# Patient Record
Sex: MALE | Race: WHITE | HISPANIC OR LATINO | Employment: FULL TIME | URBAN - METROPOLITAN AREA
[De-identification: names, ages, dates, MRNs, and addresses within clinical notes are randomized per-mention and may not be internally consistent; named-entity substitution may affect disease eponyms.]

---

## 2018-10-08 ENCOUNTER — NON-PROVIDER VISIT (OUTPATIENT)
Dept: URGENT CARE | Facility: PHYSICIAN GROUP | Age: 23
End: 2018-10-08

## 2018-10-08 DIAGNOSIS — Z02.1 PRE-EMPLOYMENT DRUG SCREENING: ICD-10-CM

## 2018-10-08 LAB
AMP AMPHETAMINE: NORMAL
COC COCAINE: NORMAL
INT CON NEG: NORMAL
INT CON POS: NORMAL
MET METHAMPHETAMINES: NORMAL
OPI OPIATES: NORMAL
PCP PHENCYCLIDINE: NORMAL
POC DRUG COMMENT 753798-OCCUPATIONAL HEALTH: NEGATIVE
THC: NORMAL

## 2018-10-08 PROCEDURE — 80305 DRUG TEST PRSMV DIR OPT OBS: CPT | Performed by: NURSE PRACTITIONER

## 2019-03-21 ENCOUNTER — HOSPITAL ENCOUNTER (OUTPATIENT)
Facility: MEDICAL CENTER | Age: 24
End: 2019-03-21
Payer: COMMERCIAL

## 2019-03-21 LAB
CHOLEST SERPL-MCNC: 136 MG/DL (ref 100–199)
FASTING STATUS PATIENT QL REPORTED: NORMAL
GLUCOSE SERPL-MCNC: 80 MG/DL (ref 65–99)
HDLC SERPL-MCNC: 53 MG/DL
LDLC SERPL CALC-MCNC: 75 MG/DL
TRIGL SERPL-MCNC: 38 MG/DL (ref 0–149)

## 2021-12-11 ENCOUNTER — HOSPITAL ENCOUNTER (INPATIENT)
Facility: MEDICAL CENTER | Age: 26
LOS: 4 days | DRG: 177 | End: 2021-12-15
Attending: EMERGENCY MEDICINE | Admitting: INTERNAL MEDICINE
Payer: COMMERCIAL

## 2021-12-11 ENCOUNTER — APPOINTMENT (OUTPATIENT)
Dept: RADIOLOGY | Facility: MEDICAL CENTER | Age: 26
DRG: 177 | End: 2021-12-11
Attending: EMERGENCY MEDICINE
Payer: COMMERCIAL

## 2021-12-11 DIAGNOSIS — R09.02 HYPOXIA: ICD-10-CM

## 2021-12-11 DIAGNOSIS — U07.1 PNEUMONIA DUE TO COVID-19 VIRUS: ICD-10-CM

## 2021-12-11 DIAGNOSIS — J15.9 COMMUNITY ACQUIRED BACTERIAL PNEUMONIA: ICD-10-CM

## 2021-12-11 DIAGNOSIS — J96.01 ACUTE RESPIRATORY FAILURE WITH HYPOXIA (HCC): ICD-10-CM

## 2021-12-11 DIAGNOSIS — J12.82 PNEUMONIA DUE TO COVID-19 VIRUS: ICD-10-CM

## 2021-12-11 PROBLEM — R11.0 NAUSEA: Status: RESOLVED | Noted: 2021-12-11 | Resolved: 2021-12-11

## 2021-12-11 PROBLEM — R50.9 FEVER: Status: ACTIVE | Noted: 2021-12-11

## 2021-12-11 PROBLEM — R30.0 DYSURIA: Status: ACTIVE | Noted: 2021-12-11

## 2021-12-11 PROBLEM — R11.0 NAUSEA: Status: ACTIVE | Noted: 2021-12-11

## 2021-12-11 PROBLEM — R50.9 FEVER: Status: RESOLVED | Noted: 2021-12-11 | Resolved: 2021-12-11

## 2021-12-11 PROBLEM — R05.9 COUGH: Status: RESOLVED | Noted: 2021-12-11 | Resolved: 2021-12-11

## 2021-12-11 PROBLEM — R05.9 COUGH: Status: ACTIVE | Noted: 2021-12-11

## 2021-12-11 LAB
ALBUMIN SERPL BCP-MCNC: 3.8 G/DL (ref 3.2–4.9)
ALBUMIN/GLOB SERPL: 1.1 G/DL
ALP SERPL-CCNC: 77 U/L (ref 30–99)
ALT SERPL-CCNC: 50 U/L (ref 2–50)
ANION GAP SERPL CALC-SCNC: 15 MMOL/L (ref 7–16)
APPEARANCE UR: CLEAR
AST SERPL-CCNC: 58 U/L (ref 12–45)
BACTERIA #/AREA URNS HPF: NEGATIVE /HPF
BASOPHILS # BLD AUTO: 0.2 % (ref 0–1.8)
BASOPHILS # BLD: 0.02 K/UL (ref 0–0.12)
BILIRUB SERPL-MCNC: 0.7 MG/DL (ref 0.1–1.5)
BILIRUB UR QL STRIP.AUTO: NEGATIVE
BUN SERPL-MCNC: 14 MG/DL (ref 8–22)
CALCIUM SERPL-MCNC: 8.5 MG/DL (ref 8.5–10.5)
CHLORIDE SERPL-SCNC: 95 MMOL/L (ref 96–112)
CO2 SERPL-SCNC: 24 MMOL/L (ref 20–33)
COLOR UR: YELLOW
CREAT SERPL-MCNC: 1.04 MG/DL (ref 0.5–1.4)
D DIMER PPP IA.FEU-MCNC: 1.22 UG/ML (FEU) (ref 0–0.5)
EKG IMPRESSION: NORMAL
EOSINOPHIL # BLD AUTO: 0 K/UL (ref 0–0.51)
EOSINOPHIL NFR BLD: 0 % (ref 0–6.9)
EPI CELLS #/AREA URNS HPF: NEGATIVE /HPF
ERYTHROCYTE [DISTWIDTH] IN BLOOD BY AUTOMATED COUNT: 37.3 FL (ref 35.9–50)
FLUAV RNA SPEC QL NAA+PROBE: NEGATIVE
FLUBV RNA SPEC QL NAA+PROBE: NEGATIVE
GLOBULIN SER CALC-MCNC: 3.4 G/DL (ref 1.9–3.5)
GLUCOSE SERPL-MCNC: 120 MG/DL (ref 65–99)
GLUCOSE UR STRIP.AUTO-MCNC: NEGATIVE MG/DL
HCT VFR BLD AUTO: 49.4 % (ref 42–52)
HGB BLD-MCNC: 16.6 G/DL (ref 14–18)
HYALINE CASTS #/AREA URNS LPF: ABNORMAL /LPF
IMM GRANULOCYTES # BLD AUTO: 0.08 K/UL (ref 0–0.11)
IMM GRANULOCYTES NFR BLD AUTO: 0.8 % (ref 0–0.9)
KETONES UR STRIP.AUTO-MCNC: NEGATIVE MG/DL
LACTATE BLD-SCNC: 1.9 MMOL/L (ref 0.5–2)
LEUKOCYTE ESTERASE UR QL STRIP.AUTO: NEGATIVE
LYMPHOCYTES # BLD AUTO: 0.55 K/UL (ref 1–4.8)
LYMPHOCYTES NFR BLD: 5.3 % (ref 22–41)
MCH RBC QN AUTO: 28.1 PG (ref 27–33)
MCHC RBC AUTO-ENTMCNC: 33.6 G/DL (ref 33.7–35.3)
MCV RBC AUTO: 83.7 FL (ref 81.4–97.8)
MICRO URNS: ABNORMAL
MONOCYTES # BLD AUTO: 0.41 K/UL (ref 0–0.85)
MONOCYTES NFR BLD AUTO: 4 % (ref 0–13.4)
NEUTROPHILS # BLD AUTO: 9.29 K/UL (ref 1.82–7.42)
NEUTROPHILS NFR BLD: 89.7 % (ref 44–72)
NITRITE UR QL STRIP.AUTO: NEGATIVE
NRBC # BLD AUTO: 0 K/UL
NRBC BLD-RTO: 0 /100 WBC
PH UR STRIP.AUTO: 6.5 [PH] (ref 5–8)
PLATELET # BLD AUTO: 161 K/UL (ref 164–446)
PMV BLD AUTO: 10.5 FL (ref 9–12.9)
POTASSIUM SERPL-SCNC: 4 MMOL/L (ref 3.6–5.5)
PROCALCITONIN SERPL-MCNC: 0.47 NG/ML
PROT SERPL-MCNC: 7.2 G/DL (ref 6–8.2)
PROT UR QL STRIP: 30 MG/DL
RBC # BLD AUTO: 5.9 M/UL (ref 4.7–6.1)
RBC # URNS HPF: ABNORMAL /HPF
RBC UR QL AUTO: NEGATIVE
RSV RNA SPEC QL NAA+PROBE: NEGATIVE
SARS-COV-2 RNA RESP QL NAA+PROBE: DETECTED
SODIUM SERPL-SCNC: 134 MMOL/L (ref 135–145)
SP GR UR STRIP.AUTO: 1.01
SPECIMEN SOURCE: ABNORMAL
UROBILINOGEN UR STRIP.AUTO-MCNC: 0.2 MG/DL
WBC # BLD AUTO: 10.4 K/UL (ref 4.8–10.8)
WBC #/AREA URNS HPF: ABNORMAL /HPF

## 2021-12-11 PROCEDURE — C9803 HOPD COVID-19 SPEC COLLECT: HCPCS | Performed by: EMERGENCY MEDICINE

## 2021-12-11 PROCEDURE — 81001 URINALYSIS AUTO W/SCOPE: CPT

## 2021-12-11 PROCEDURE — 770020 HCHG ROOM/CARE - TELE (206)

## 2021-12-11 PROCEDURE — 83605 ASSAY OF LACTIC ACID: CPT

## 2021-12-11 PROCEDURE — A9270 NON-COVERED ITEM OR SERVICE: HCPCS | Performed by: EMERGENCY MEDICINE

## 2021-12-11 PROCEDURE — 87040 BLOOD CULTURE FOR BACTERIA: CPT | Mod: 91

## 2021-12-11 PROCEDURE — 700102 HCHG RX REV CODE 250 W/ 637 OVERRIDE(OP)

## 2021-12-11 PROCEDURE — 93005 ELECTROCARDIOGRAM TRACING: CPT

## 2021-12-11 PROCEDURE — 99285 EMERGENCY DEPT VISIT HI MDM: CPT

## 2021-12-11 PROCEDURE — 0241U HCHG SARS-COV-2 COVID-19 NFCT DS RESP RNA 4 TRGT MIC: CPT

## 2021-12-11 PROCEDURE — 80053 COMPREHEN METABOLIC PANEL: CPT

## 2021-12-11 PROCEDURE — 85379 FIBRIN DEGRADATION QUANT: CPT

## 2021-12-11 PROCEDURE — A9270 NON-COVERED ITEM OR SERVICE: HCPCS

## 2021-12-11 PROCEDURE — 700102 HCHG RX REV CODE 250 W/ 637 OVERRIDE(OP): Performed by: EMERGENCY MEDICINE

## 2021-12-11 PROCEDURE — 93010 ELECTROCARDIOGRAM REPORT: CPT | Performed by: INTERNAL MEDICINE

## 2021-12-11 PROCEDURE — 85025 COMPLETE CBC W/AUTO DIFF WBC: CPT

## 2021-12-11 PROCEDURE — 71045 X-RAY EXAM CHEST 1 VIEW: CPT

## 2021-12-11 PROCEDURE — A9270 NON-COVERED ITEM OR SERVICE: HCPCS | Performed by: INTERNAL MEDICINE

## 2021-12-11 PROCEDURE — 700102 HCHG RX REV CODE 250 W/ 637 OVERRIDE(OP): Performed by: INTERNAL MEDICINE

## 2021-12-11 PROCEDURE — 84145 PROCALCITONIN (PCT): CPT

## 2021-12-11 RX ORDER — POLYETHYLENE GLYCOL 3350 17 G/17G
1 POWDER, FOR SOLUTION ORAL
Status: DISCONTINUED | OUTPATIENT
Start: 2021-12-11 | End: 2021-12-12

## 2021-12-11 RX ORDER — CHLORAL HYDRATE 500 MG
1000 CAPSULE ORAL DAILY
COMMUNITY

## 2021-12-11 RX ORDER — ALBUTEROL SULFATE 90 UG/1
2 AEROSOL, METERED RESPIRATORY (INHALATION) EVERY 6 HOURS PRN
Status: ON HOLD | COMMUNITY
Start: 2021-12-08 | End: 2021-12-15

## 2021-12-11 RX ORDER — GUAIFENESIN 600 MG/1
600 TABLET, EXTENDED RELEASE ORAL EVERY 12 HOURS
Status: DISCONTINUED | OUTPATIENT
Start: 2021-12-11 | End: 2021-12-11

## 2021-12-11 RX ORDER — GUAIFENESIN/DEXTROMETHORPHAN 100-10MG/5
10 SYRUP ORAL EVERY 6 HOURS PRN
Status: DISCONTINUED | OUTPATIENT
Start: 2021-12-11 | End: 2021-12-11

## 2021-12-11 RX ORDER — GUAIFENESIN 600 MG/1
600 TABLET, EXTENDED RELEASE ORAL EVERY 8 HOURS
Status: DISCONTINUED | OUTPATIENT
Start: 2021-12-11 | End: 2021-12-15 | Stop reason: HOSPADM

## 2021-12-11 RX ORDER — ALBUTEROL SULFATE 90 UG/1
2 AEROSOL, METERED RESPIRATORY (INHALATION)
Status: DISCONTINUED | OUTPATIENT
Start: 2021-12-11 | End: 2021-12-15 | Stop reason: HOSPADM

## 2021-12-11 RX ORDER — ALBUTEROL SULFATE 90 UG/1
2 AEROSOL, METERED RESPIRATORY (INHALATION)
Status: DISCONTINUED | OUTPATIENT
Start: 2021-12-11 | End: 2021-12-11

## 2021-12-11 RX ORDER — ONDANSETRON 4 MG/1
4 TABLET, ORALLY DISINTEGRATING ORAL EVERY 4 HOURS PRN
Status: DISCONTINUED | OUTPATIENT
Start: 2021-12-11 | End: 2021-12-15 | Stop reason: HOSPADM

## 2021-12-11 RX ORDER — ACETAMINOPHEN 325 MG/1
650 TABLET ORAL EVERY 6 HOURS PRN
Status: DISCONTINUED | OUTPATIENT
Start: 2021-12-11 | End: 2021-12-15 | Stop reason: HOSPADM

## 2021-12-11 RX ORDER — DEXAMETHASONE 6 MG/1
6 TABLET ORAL DAILY
Status: DISCONTINUED | OUTPATIENT
Start: 2021-12-11 | End: 2021-12-15 | Stop reason: HOSPADM

## 2021-12-11 RX ORDER — ACETAMINOPHEN 325 MG/1
650 TABLET ORAL ONCE
Status: COMPLETED | OUTPATIENT
Start: 2021-12-11 | End: 2021-12-11

## 2021-12-11 RX ORDER — BISACODYL 10 MG
10 SUPPOSITORY, RECTAL RECTAL
Status: DISCONTINUED | OUTPATIENT
Start: 2021-12-11 | End: 2021-12-12

## 2021-12-11 RX ORDER — AMOXICILLIN 250 MG
2 CAPSULE ORAL 2 TIMES DAILY
Status: DISCONTINUED | OUTPATIENT
Start: 2021-12-12 | End: 2021-12-12

## 2021-12-11 RX ADMIN — ACETAMINOPHEN 650 MG: 325 TABLET, FILM COATED ORAL at 11:35

## 2021-12-11 RX ADMIN — DEXAMETHASONE 6 MG: 6 TABLET ORAL at 12:36

## 2021-12-11 RX ADMIN — GUAIFENESIN 600 MG: 600 TABLET, EXTENDED RELEASE ORAL at 21:51

## 2021-12-11 RX ADMIN — ALBUTEROL SULFATE 2 PUFF: 90 AEROSOL, METERED RESPIRATORY (INHALATION) at 21:51

## 2021-12-11 ASSESSMENT — ENCOUNTER SYMPTOMS
WEIGHT LOSS: 0
WHEEZING: 0
FEVER: 1
HEMOPTYSIS: 0
FLANK PAIN: 0
DIZZINESS: 1
DOUBLE VISION: 0
SHORTNESS OF BREATH: 1
SINUS PAIN: 0
BLURRED VISION: 0
CONSTIPATION: 0
HEADACHES: 1
FOCAL WEAKNESS: 0
DEPRESSION: 0
DIARRHEA: 0
INSOMNIA: 1
SPEECH CHANGE: 0
NERVOUS/ANXIOUS: 0
FALLS: 0
SPUTUM PRODUCTION: 1
ABDOMINAL PAIN: 0
SORE THROAT: 0
PALPITATIONS: 1
CHILLS: 1
BLOOD IN STOOL: 0
EYE REDNESS: 0
MYALGIAS: 1
ABDOMINAL PAIN: 1
NAUSEA: 1
EYE PAIN: 0
COUGH: 1
CLAUDICATION: 0
VOMITING: 0
LOSS OF CONSCIOUSNESS: 0
BRUISES/BLEEDS EASILY: 0
CHILLS: 0
WHEEZING: 1
DIAPHORESIS: 0
WEAKNESS: 1
PALPITATIONS: 0

## 2021-12-11 ASSESSMENT — PATIENT HEALTH QUESTIONNAIRE - PHQ9
1. LITTLE INTEREST OR PLEASURE IN DOING THINGS: NOT AT ALL
2. FEELING DOWN, DEPRESSED, IRRITABLE, OR HOPELESS: NOT AT ALL
SUM OF ALL RESPONSES TO PHQ9 QUESTIONS 1 AND 2: 0

## 2021-12-11 ASSESSMENT — LIFESTYLE VARIABLES
HAVE YOU EVER FELT YOU SHOULD CUT DOWN ON YOUR DRINKING: NO
TOTAL SCORE: 0
EVER HAD A DRINK FIRST THING IN THE MORNING TO STEADY YOUR NERVES TO GET RID OF A HANGOVER: NO
EVER FELT BAD OR GUILTY ABOUT YOUR DRINKING: NO
HOW MANY TIMES IN THE PAST YEAR HAVE YOU HAD 5 OR MORE DRINKS IN A DAY: 0
ON A TYPICAL DAY WHEN YOU DRINK ALCOHOL HOW MANY DRINKS DO YOU HAVE: 0
EVER_SMOKED: NEVER
AVERAGE NUMBER OF DAYS PER WEEK YOU HAVE A DRINK CONTAINING ALCOHOL: 0
TOTAL SCORE: 0
HAVE PEOPLE ANNOYED YOU BY CRITICIZING YOUR DRINKING: NO
TOTAL SCORE: 0
CONSUMPTION TOTAL: NEGATIVE
ALCOHOL_USE: NO

## 2021-12-11 ASSESSMENT — COGNITIVE AND FUNCTIONAL STATUS - GENERAL
DAILY ACTIVITIY SCORE: 24
MOBILITY SCORE: 24
SUGGESTED CMS G CODE MODIFIER MOBILITY: CH
SUGGESTED CMS G CODE MODIFIER DAILY ACTIVITY: CH

## 2021-12-11 ASSESSMENT — FIBROSIS 4 INDEX
FIB4 SCORE: 1.27
FIB4 SCORE: 1.27

## 2021-12-11 ASSESSMENT — COPD QUESTIONNAIRES
HAVE YOU SMOKED AT LEAST 100 CIGARETTES IN YOUR ENTIRE LIFE: NO/DON'T KNOW
COPD SCREENING SCORE: 3
DO YOU EVER COUGH UP ANY MUCUS OR PHLEGM?: NO/ONLY WITH OCCASIONAL COLDS OR INFECTIONS
DURING THE PAST 4 WEEKS HOW MUCH DID YOU FEEL SHORT OF BREATH: MOST  OR ALL OF THE TIME

## 2021-12-11 NOTE — ED NOTES
Pt ambulated with a steady gait. Pt ambulated with 4L O2. Pt held at 90% but dropped to 87% with 4L after continuing to ambulate. Pt also became tachycardic at 120bpm.   warm

## 2021-12-11 NOTE — ED TRIAGE NOTES
"Chief Complaint   Patient presents with   • Shortness of Breath     X 2 days, dx of Covid 12/7, sx started 12/5, pt went to  on 12/7, did not require O2, sent home, pt was 83% on RA at triage, VSS on 6L NC, denies medical hx   • Fever     X 6 days, high of 101F at home, afebrile at triage     Pt ambulatory to triage for above complaints, VSS on RA, GCS 15, NAD.    Pt returned to [facility]. Educated on triage process and to inform staff of any changes.     /71   Pulse (!) 120   Temp 37.3 °C (99.1 °F) (Oral)   Resp 18   Ht 1.727 m (5' 8\")   Wt 94.3 kg (208 lb)   SpO2 91%   BMI 31.63 kg/m²      "

## 2021-12-11 NOTE — ED NOTES
Pt ambulated without assistance to green 26. Currently on 6L 02, dyspnea with exertion noted. Wife at bedside.

## 2021-12-11 NOTE — ED PROVIDER NOTES
"ED Provider Note    CHIEF COMPLAINT  Chief Complaint   Patient presents with   • Shortness of Breath     X 2 days, dx of Covid 12/7, sx started 12/5, pt went to  on 12/7, did not require O2, sent home, pt was 83% on RA at triage, VSS on 6L NC, denies medical hx   • Fever     X 6 days, high of 101F at home, afebrile at triage       HPI  Rakan Beltrán is a 26 y.o. unvaccinated Covid positive male who presents complaining of worsening shortness of breath and fever.    Pt dx'd with Covid on 12/7 at  after sxs began on 12/5 per patient.  However, the patient's significant other is at the bedside and states he started feeling ill just after Thanksgiving and was tested on 11/28.    Patient was \"gasping for air\" and increasingly short of breath overnight.    Patient denies history of asthma, tobacco use, vomiting, diarrhea, leg swelling, calf pain, hemoptysis, history of PE/DVT in self or family.        ALLERGIES  No Known Allergies    CURRENT MEDICATIONS  Home Medications     Reviewed by Cisco Colon R.N. (Registered Nurse) on 12/11/21 at 0957  Med List Status: <None>   Medication Last Dose Status        Patient Sitxo Taking any Medications                       PAST MEDICAL HISTORY     Denies    SURGICAL HISTORY  patient denies any surgical history    SOCIAL HISTORY  Patient here with his significant other with whom he lives  Denies tobacco and drug use    Family Hx:  Diabetes  No history of PE/DVT or ACS    REVIEW OF SYSTEMS  See HPI for further details.  All other systems are negative except as above in HPI.      PHYSICAL EXAM  VITAL SIGNS: /75   Pulse 83   Temp (!) 38.8 °C (101.9 °F) (Oral)   Resp 20   Ht 1.727 m (5' 8\")   Wt 94.3 kg (208 lb)   SpO2 95%   BMI 31.63 kg/m²     General:  WDWN male, nontoxic appearing in NAD; A+Ox3; V/S as above; afebrile but tachycardic at triage; hypoxic  Skin: warm and dry; good color; no rash  HEENT: NCAT; EOMs intact; PERRL; no scleral icterus   Neck: FROM; no " meningismus, no JVD  Cardiovascular: Regular heart rate and rhythm.  No murmurs, rubs, or gallops; pulses 2+ bilaterally radially  Lungs: Clear to auscultation with good air movement bilaterally.  No wheezes, rhonchi, or rales.   Abdomen: BS present; soft; NTND; no rebound, guarding, or rigidity.  No organomegaly or pulsatile mass  Extremities: SERNA x 4; no e/o trauma; no pedal edema; neg Regina's  Neurologic: CNs III-XII grossly intact; speech clear; distal sensation intact; strength 5/5 UE/LEs  Psychiatric: Appropriate affect, normal mood    LABS  Results for orders placed or performed during the hospital encounter of 12/11/21   CBC WITH DIFFERENTIAL   Result Value Ref Range    WBC 10.4 4.8 - 10.8 K/uL    RBC 5.90 4.70 - 6.10 M/uL    Hemoglobin 16.6 14.0 - 18.0 g/dL    Hematocrit 49.4 42.0 - 52.0 %    MCV 83.7 81.4 - 97.8 fL    MCH 28.1 27.0 - 33.0 pg    MCHC 33.6 (L) 33.7 - 35.3 g/dL    RDW 37.3 35.9 - 50.0 fL    Platelet Count 161 (L) 164 - 446 K/uL    MPV 10.5 9.0 - 12.9 fL    Neutrophils-Polys 89.70 (H) 44.00 - 72.00 %    Lymphocytes 5.30 (L) 22.00 - 41.00 %    Monocytes 4.00 0.00 - 13.40 %    Eosinophils 0.00 0.00 - 6.90 %    Basophils 0.20 0.00 - 1.80 %    Immature Granulocytes 0.80 0.00 - 0.90 %    Nucleated RBC 0.00 /100 WBC    Neutrophils (Absolute) 9.29 (H) 1.82 - 7.42 K/uL    Lymphs (Absolute) 0.55 (L) 1.00 - 4.80 K/uL    Monos (Absolute) 0.41 0.00 - 0.85 K/uL    Eos (Absolute) 0.00 0.00 - 0.51 K/uL    Baso (Absolute) 0.02 0.00 - 0.12 K/uL    Immature Granulocytes (abs) 0.08 0.00 - 0.11 K/uL    NRBC (Absolute) 0.00 K/uL   D-DIMER   Result Value Ref Range    D-Dimer Screen 1.22 (H) 0.00 - 0.50 ug/mL (FEU)   CMP   Result Value Ref Range    Sodium 134 (L) 135 - 145 mmol/L    Potassium 4.0 3.6 - 5.5 mmol/L    Chloride 95 (L) 96 - 112 mmol/L    Co2 24 20 - 33 mmol/L    Anion Gap 15.0 7.0 - 16.0    Glucose 120 (H) 65 - 99 mg/dL    Bun 14 8 - 22 mg/dL    Creatinine 1.04 0.50 - 1.40 mg/dL    Calcium 8.5 8.5 -  10.5 mg/dL    AST(SGOT) 58 (H) 12 - 45 U/L    ALT(SGPT) 50 2 - 50 U/L    Alkaline Phosphatase 77 30 - 99 U/L    Total Bilirubin 0.7 0.1 - 1.5 mg/dL    Albumin 3.8 3.2 - 4.9 g/dL    Total Protein 7.2 6.0 - 8.2 g/dL    Globulin 3.4 1.9 - 3.5 g/dL    A-G Ratio 1.1 g/dL   COV-2, FLU A/B, AND RSV BY PCR (2-4 HOURS CEPHEID): Collect NP swab in VTM    Specimen: Respirate   Result Value Ref Range    Influenza virus A RNA Negative Negative    Influenza virus B, PCR Negative Negative    RSV, PCR Negative Negative    SARS-CoV-2 by PCR DETECTED (AA)     SARS-CoV-2 Source NP Swab    LACTIC ACID   Result Value Ref Range    Lactic Acid 1.9 0.5 - 2.0 mmol/L   PROCALCITONIN   Result Value Ref Range    Procalcitonin 0.47 (H) <0.25 ng/mL   ESTIMATED GFR   Result Value Ref Range    GFR If African American >60 >60 mL/min/1.73 m 2    GFR If Non African American >60 >60 mL/min/1.73 m 2       IMAGING  DX-CHEST-PORTABLE (1 VIEW)   Final Result      Bibasilar interstitial opacities consistent with Covid 19 pneumonia.          MEDICAL RECORD  I have reviewed patient's medical record and pertinent results are listed below.      COURSE & MEDICAL DECISION MAKING  I have reviewed any medical record information, laboratory studies and radiographic results as noted.    Rakan Beltrán is a 26 y.o. unvaccinated male with Covid since 11/26 who presents complaining of shortness of breath that is worsening.  Patient's O2 sat on room air was 83%.  He answers questions but appears somewhat confused with regards to dates and timing of his illness.  His significant other answers questions at the bedside.    Chest x-ray demonstrates bilateral patchy opacities consistent with Covid pneumonia.    Appropriate PPE was worn at all times while interacting with the patient.    Procalcitonin was elevated to 0.47, D-dimer 1.22 not necessitating further work-up for PE, sodium 134, glucose 120, AST 58, thrombocytopenia 161, normal white blood cell count, no anemia,  and normal lactic acid.    Patient advised that he has Covid pneumonia.  Patient currently on 4 L with an O2 sat of 95% at rest.  Patient received Decadron 6 mg p.o.  We will ambulate and obtain a pulse ox on 4 L.    2:19 PM  Patient's O2 sat dropped to 87% on 4 L with ambulation and was tachycardic at 120.  [facility] will [facility]    2:37 PM  Discussed with Dr. Bell who agrees to admit.    FINAL IMPRESSION  1. Pneumonia due to COVID-19 virus     2. Hypoxia              Electronically signed by: Africa Darling M.D., 12/11/2021 10:45 AM

## 2021-12-11 NOTE — NON-PROVIDER
"History & Physical Note    Date of Admission: 12/11/2021  Admission Status: Emergency  [facility] Team: [facility]  Attending: Gina Lee M.D.   Senior Resident: Dr. Magda Bell  Intern: Dr. Trinidad Hall  Contact Number: [phone]    Chief Complaint: Dyspnea, shortness of breath.     History of Present Illness (HPI):  Rakan is a 26 y.o. male who presented 12/11/2021 with shortness of breath and cough productive of clear sputum that began on 11/27/21. The patient was tested for COVID-19 on 11/28/21 and received a positive result on 12/05/21. In the last couple of days, the patient reports progressive dyspnea and cough, especially on exertion. The patient states that, yesterday, ambulating to the bathroom caused him cough so much that he \"almost black out\". The patient's fiance states that while the patient is sleeping he grunts and sounds as if he is gasping for air. The patient also endorses intermittent fevers (measuring 101-102 degrees), chills, extreme fatigue, nausea, arthralgias, and myalgias.    Review of Systems:   Review of Systems   Constitutional: Positive for chills, fever and malaise/fatigue.   HENT: Negative for hearing loss, sinus pain, sore throat and tinnitus.    Eyes: Negative for blurred vision, double vision, pain and redness.   Respiratory: Positive for cough, sputum production, shortness of breath and wheezing.    Cardiovascular: Negative for chest pain, palpitations and leg swelling.   Gastrointestinal: Positive for abdominal pain and nausea. Negative for blood in stool, constipation, diarrhea and vomiting.   Genitourinary: Positive for dysuria. Negative for flank pain, frequency, hematuria and urgency.        Patient has several weeks of dysuria and difficulty initiating stream.    Musculoskeletal: Positive for joint pain and myalgias.   Skin: Negative for rash.   Neurological: Positive for dizziness and headaches. Negative for speech change, focal weakness and loss of " consciousness.   Endo/Heme/Allergies: Does not bruise/bleed easily.   Psychiatric/Behavioral: Negative for depression. The patient has insomnia. The patient is not nervous/anxious.      Past Medical History:   Past Medical History was reviewed with patient.   has no past medical history on file.    Past Surgical History: Past Surgical History was reviewed with patient.   has no past surgical history on file.    Medications: Medications have been reviewed with patient.  Prior to Admission Medications   Prescriptions Last Dose Informant Patient Reported? Taking?   Ascorbic Acid (VITAMIN C PO) 12/10/2021 at Unknown time  Yes Yes   Sig: Take 1 Tablet by mouth every day.   Omega-3 Fatty Acids (FISH OIL) 1000 MG Cap capsule 12/10/2021 at Unknown time  Yes Yes   Sig: Take 1,000 mg by mouth every day.   albuterol 108 (90 Base) MCG/ACT Aero Soln inhalation aerosol 12/11/2021 at Unknown time  Yes Yes   Sig: Inhale 2 Puffs every 6 hours as needed.   multivitamin (THERAGRAN) Tab 12/10/2021 at Unknown time  Yes Yes   Sig: Take 1 Tablet by mouth every day.      Facility-Administered Medications: None        Allergies: Allergies have been reviewed with patient.  No Known Allergies    Family History:   Family history is positive for type 2 diabetes mellitus in his mother and hypertension in both sides of his family. The patient denies cancer, hyperlipidemia, or genetic conditions that are present in his family.     Social History:   Tobacco: Patient endorses vaping for the last 6-8 months. Goes through 1 cartridge every 3 months.   Alcohol: Patient denies alcohol use.   Recreational drugs (illegal and prescription): Patient denies recreation drug use.   Employment: Works at the [employer].   Activity Level: Patient has an active lifestyle.   Living situation:  Lives with [facility]   Recent travel:  [place] 2 [place] ago   Primary Care Provider: not reviewed Pcp Pt States None  Other  (stressors, spirituality, exposures):  Patient was incarcerated for 6 months 1-year-ago.   Physical Exam:   Vitals:  Temp:  [37.3 °C (99.1 °F)-38.8 °C (101.9 °F)] 38.8 °C (101.9 °F)  Pulse:  [] 79  Resp:  [18-20] 20  BP: (120-130)/(71-75) 130/75  SpO2:  [83 %-96 %] 95 %    Physical Exam  Constitutional:       General: He is not in acute distress.     Appearance: Normal appearance. He is ill-appearing.   HENT:      Head: Normocephalic and atraumatic.      Mouth/Throat:      Mouth: Mucous membranes are moist.      Pharynx: Oropharynx is clear. No oropharyngeal exudate or posterior oropharyngeal erythema.   Eyes:      Extraocular Movements: Extraocular movements intact.      Conjunctiva/sclera: Conjunctivae normal.      Pupils: Pupils are equal, round, and reactive to light.   Neck:      Vascular: No carotid bruit.   Cardiovascular:      Rate and Rhythm: Normal rate and regular rhythm.      Pulses: Normal pulses.      Heart sounds: No murmur heard.  No friction rub. No gallop.    Pulmonary:      Breath sounds: No stridor. Rales present. No wheezing or rhonchi.      Comments: Patient current requiring 6 L of oxygen. Patient is tachypneic with a respiratory rate of 27-33 breaths per minute. Coarse rales are present, more predominantly in the lower lobes.   Chest:      Chest wall: No tenderness.   Abdominal:      General: Bowel sounds are normal. There is no distension.      Palpations: Abdomen is soft.      Tenderness: There is no abdominal tenderness. There is no guarding.   Musculoskeletal:         General: No swelling or tenderness.      Cervical back: Neck supple.   Lymphadenopathy:      Cervical: No cervical adenopathy.   Skin:     General: Skin is warm.      Capillary Refill: Capillary refill takes 2 to 3 seconds.      Findings: No erythema.   Neurological:      General: No focal deficit present.      Mental Status: He is alert. Mental status is at baseline.   Psychiatric:         Mood and Affect: Mood  normal.       Labs:   Recent Labs     12/11/21  1106   WBC 10.4   RBC 5.90   HEMOGLOBIN 16.6   HEMATOCRIT 49.4   MCV 83.7   MCH 28.1   RDW 37.3   PLATELETCT 161*   MPV 10.5   NEUTSPOLYS 89.70*   LYMPHOCYTES 5.30*   MONOCYTES 4.00   EOSINOPHILS 0.00   BASOPHILS 0.20     Recent Labs     12/11/21  1106   ALBUMIN 3.8   SODIUM 134*   POTASSIUM 4.0   CHLORIDE 95*   CO2 24   BUN 14   CREATININE 1.04     Recent Labs     12/11/21  1106   ASTSGOT 58*   ALTSGPT 50   TBILIRUBIN 0.7   ALKPHOSPHAT 77   GLOBULIN 3.4     D-dimer: 1.22  Procalcitonin: 0.47     Imaging:   DX-CHEST-PORTABLE (1 VIEW)   Final Result      Bibasilar interstitial opacities consistent with Covid 19 pneumonia.        Problem Representation: Mr. Beltrán is a 26-year-old unvaccinated male with no significant medical history who presents today with increased shortness of breath, nonproductive cough, and a positive COVID-19 test on 11/28/21 currently requiring 6 L of oxygen via nasal cannula. He febrile and has tachypnea with coarse crackles in the lower lobes of the lung. He is mildly hyponatremic and has an elevated D-dimer and procalcitonin. CXR showed bibasilar interstitial opacities, but no evidence of lobar consolidation or pleural fluid.     Assessment and Plan:    #COVID-19 pneumonia   -Patient began feeling symptomatic on 11/27/21 and received a positive result on 12/05/21.   -CXR findings consistent with COVID-19 pneumonia.   -Currently saturating at 95% on 6 L of oxygen via nasal cannula.     -Begin isolation protocol.   -Begin 10 day course of dexamethasone (end date 12/21/21).  -Begin 40 mg enoxaparin inj for hypercoagulable state.     #Dyspnea  -Secondary to COVID-19 pneumonia.   -Currently requiring 6L of oxygen via nasal cannula.    -Robitussin 10 mL Q6hrs PRN.  -Albuterol inhaler 2 puffs Q4hrs PRN.     #Nausea  -Mild-moderate nausea secondary to COVID pneumonia.  -No vomiting or diarrhea.    -Zofran 4 mg Q4hrs PRN.     #Dysuria  -Several weeks  of dysuria and difficulty initiating stream.   -No hematuria or foul-smelling urine.     -Order urinalysis for possible UTI or STI.   -Will investigate risky sexual behavior further.     Dispo: Inpatient.    This note was written by Jacoby Sapp, MS3.

## 2021-12-12 ENCOUNTER — APPOINTMENT (OUTPATIENT)
Dept: RADIOLOGY | Facility: MEDICAL CENTER | Age: 26
DRG: 177 | End: 2021-12-12
Attending: STUDENT IN AN ORGANIZED HEALTH CARE EDUCATION/TRAINING PROGRAM
Payer: COMMERCIAL

## 2021-12-12 PROBLEM — J15.9 COMMUNITY ACQUIRED BACTERIAL PNEUMONIA: Status: ACTIVE | Noted: 2021-12-12

## 2021-12-12 PROBLEM — K59.00 CONSTIPATION: Status: ACTIVE | Noted: 2021-12-12

## 2021-12-12 LAB
ALBUMIN SERPL BCP-MCNC: 4 G/DL (ref 3.2–4.9)
ALBUMIN/GLOB SERPL: 1.4 G/DL
ALP SERPL-CCNC: 71 U/L (ref 30–99)
ALT SERPL-CCNC: 43 U/L (ref 2–50)
ANION GAP SERPL CALC-SCNC: 11 MMOL/L (ref 7–16)
AST SERPL-CCNC: 44 U/L (ref 12–45)
BASE EXCESS BLDA CALC-SCNC: 2 MMOL/L (ref -4–3)
BASOPHILS # BLD AUTO: 0.1 % (ref 0–1.8)
BASOPHILS # BLD: 0.01 K/UL (ref 0–0.12)
BILIRUB SERPL-MCNC: 0.5 MG/DL (ref 0.1–1.5)
BODY TEMPERATURE: ABNORMAL CENTIGRADE
BUN SERPL-MCNC: 14 MG/DL (ref 8–22)
CALCIUM SERPL-MCNC: 9.2 MG/DL (ref 8.5–10.5)
CHLORIDE SERPL-SCNC: 100 MMOL/L (ref 96–112)
CO2 SERPL-SCNC: 27 MMOL/L (ref 20–33)
CREAT SERPL-MCNC: 0.74 MG/DL (ref 0.5–1.4)
CRP SERPL HS-MCNC: 9.39 MG/DL (ref 0–0.75)
EKG IMPRESSION: NORMAL
EOSINOPHIL # BLD AUTO: 0 K/UL (ref 0–0.51)
EOSINOPHIL NFR BLD: 0 % (ref 0–6.9)
ERYTHROCYTE [DISTWIDTH] IN BLOOD BY AUTOMATED COUNT: 37.3 FL (ref 35.9–50)
GLOBULIN SER CALC-MCNC: 2.9 G/DL (ref 1.9–3.5)
GLUCOSE SERPL-MCNC: 133 MG/DL (ref 65–99)
HCO3 BLDA-SCNC: 26 MMOL/L (ref 17–25)
HCT VFR BLD AUTO: 46.1 % (ref 42–52)
HGB BLD-MCNC: 15.9 G/DL (ref 14–18)
IMM GRANULOCYTES # BLD AUTO: 0.09 K/UL (ref 0–0.11)
IMM GRANULOCYTES NFR BLD AUTO: 0.9 % (ref 0–0.9)
LDH SERPL L TO P-CCNC: 481 U/L (ref 107–266)
LYMPHOCYTES # BLD AUTO: 0.62 K/UL (ref 1–4.8)
LYMPHOCYTES NFR BLD: 5.9 % (ref 22–41)
MAGNESIUM SERPL-MCNC: 2.3 MG/DL (ref 1.5–2.5)
MCH RBC QN AUTO: 28.6 PG (ref 27–33)
MCHC RBC AUTO-ENTMCNC: 34.5 G/DL (ref 33.7–35.3)
MCV RBC AUTO: 82.9 FL (ref 81.4–97.8)
MONOCYTES # BLD AUTO: 0.5 K/UL (ref 0–0.85)
MONOCYTES NFR BLD AUTO: 4.8 % (ref 0–13.4)
NEUTROPHILS # BLD AUTO: 9.23 K/UL (ref 1.82–7.42)
NEUTROPHILS NFR BLD: 88.3 % (ref 44–72)
NRBC # BLD AUTO: 0 K/UL
NRBC BLD-RTO: 0 /100 WBC
NT-PROBNP SERPL IA-MCNC: 50 PG/ML (ref 0–125)
PCO2 BLDA: 38.1 MMHG (ref 26–37)
PH BLDA: 7.44 [PH] (ref 7.4–7.5)
PLATELET # BLD AUTO: 160 K/UL (ref 164–446)
PMV BLD AUTO: 10.5 FL (ref 9–12.9)
PO2 BLDA: 72.9 MMHG (ref 64–87)
POTASSIUM SERPL-SCNC: 4.6 MMOL/L (ref 3.6–5.5)
PROCALCITONIN SERPL-MCNC: 0.35 NG/ML
PROT SERPL-MCNC: 6.9 G/DL (ref 6–8.2)
RBC # BLD AUTO: 5.56 M/UL (ref 4.7–6.1)
SAO2 % BLDA: 94.4 % (ref 93–99)
SODIUM SERPL-SCNC: 138 MMOL/L (ref 135–145)
WBC # BLD AUTO: 10.5 K/UL (ref 4.8–10.8)

## 2021-12-12 PROCEDURE — 85025 COMPLETE CBC W/AUTO DIFF WBC: CPT

## 2021-12-12 PROCEDURE — 83880 ASSAY OF NATRIURETIC PEPTIDE: CPT

## 2021-12-12 PROCEDURE — 770020 HCHG ROOM/CARE - TELE (206)

## 2021-12-12 PROCEDURE — 93010 ELECTROCARDIOGRAM REPORT: CPT | Performed by: STUDENT IN AN ORGANIZED HEALTH CARE EDUCATION/TRAINING PROGRAM

## 2021-12-12 PROCEDURE — 94760 N-INVAS EAR/PLS OXIMETRY 1: CPT

## 2021-12-12 PROCEDURE — A9270 NON-COVERED ITEM OR SERVICE: HCPCS | Performed by: STUDENT IN AN ORGANIZED HEALTH CARE EDUCATION/TRAINING PROGRAM

## 2021-12-12 PROCEDURE — 700102 HCHG RX REV CODE 250 W/ 637 OVERRIDE(OP): Performed by: EMERGENCY MEDICINE

## 2021-12-12 PROCEDURE — 700111 HCHG RX REV CODE 636 W/ 250 OVERRIDE (IP): Performed by: STUDENT IN AN ORGANIZED HEALTH CARE EDUCATION/TRAINING PROGRAM

## 2021-12-12 PROCEDURE — 700102 HCHG RX REV CODE 250 W/ 637 OVERRIDE(OP): Performed by: STUDENT IN AN ORGANIZED HEALTH CARE EDUCATION/TRAINING PROGRAM

## 2021-12-12 PROCEDURE — 99223 1ST HOSP IP/OBS HIGH 75: CPT | Mod: GC | Performed by: INTERNAL MEDICINE

## 2021-12-12 PROCEDURE — A9270 NON-COVERED ITEM OR SERVICE: HCPCS | Performed by: EMERGENCY MEDICINE

## 2021-12-12 PROCEDURE — A9270 NON-COVERED ITEM OR SERVICE: HCPCS

## 2021-12-12 PROCEDURE — 71045 X-RAY EXAM CHEST 1 VIEW: CPT

## 2021-12-12 PROCEDURE — 83615 LACTATE (LD) (LDH) ENZYME: CPT

## 2021-12-12 PROCEDURE — 86140 C-REACTIVE PROTEIN: CPT

## 2021-12-12 PROCEDURE — 700111 HCHG RX REV CODE 636 W/ 250 OVERRIDE (IP)

## 2021-12-12 PROCEDURE — 36415 COLL VENOUS BLD VENIPUNCTURE: CPT

## 2021-12-12 PROCEDURE — 700102 HCHG RX REV CODE 250 W/ 637 OVERRIDE(OP)

## 2021-12-12 PROCEDURE — 700105 HCHG RX REV CODE 258: Performed by: STUDENT IN AN ORGANIZED HEALTH CARE EDUCATION/TRAINING PROGRAM

## 2021-12-12 PROCEDURE — 86480 TB TEST CELL IMMUN MEASURE: CPT

## 2021-12-12 PROCEDURE — 83735 ASSAY OF MAGNESIUM: CPT

## 2021-12-12 PROCEDURE — 94640 AIRWAY INHALATION TREATMENT: CPT

## 2021-12-12 PROCEDURE — 93005 ELECTROCARDIOGRAM TRACING: CPT | Performed by: STUDENT IN AN ORGANIZED HEALTH CARE EDUCATION/TRAINING PROGRAM

## 2021-12-12 PROCEDURE — 82803 BLOOD GASES ANY COMBINATION: CPT

## 2021-12-12 PROCEDURE — 80053 COMPREHEN METABOLIC PANEL: CPT

## 2021-12-12 PROCEDURE — 84145 PROCALCITONIN (PCT): CPT

## 2021-12-12 RX ORDER — BISACODYL 10 MG
10 SUPPOSITORY, RECTAL RECTAL
Status: DISCONTINUED | OUTPATIENT
Start: 2021-12-12 | End: 2021-12-15 | Stop reason: HOSPADM

## 2021-12-12 RX ORDER — POLYETHYLENE GLYCOL 3350 17 G/17G
1 POWDER, FOR SOLUTION ORAL DAILY
Status: DISCONTINUED | OUTPATIENT
Start: 2021-12-12 | End: 2021-12-15 | Stop reason: HOSPADM

## 2021-12-12 RX ORDER — AZITHROMYCIN 250 MG/1
500 TABLET, FILM COATED ORAL DAILY
Status: COMPLETED | OUTPATIENT
Start: 2021-12-12 | End: 2021-12-14

## 2021-12-12 RX ORDER — FAMOTIDINE 20 MG/1
10 TABLET, FILM COATED ORAL 2 TIMES DAILY
Status: DISCONTINUED | OUTPATIENT
Start: 2021-12-12 | End: 2021-12-15 | Stop reason: HOSPADM

## 2021-12-12 RX ORDER — FUROSEMIDE 10 MG/ML
20 INJECTION INTRAMUSCULAR; INTRAVENOUS ONCE
Status: COMPLETED | OUTPATIENT
Start: 2021-12-12 | End: 2021-12-12

## 2021-12-12 RX ORDER — MIDODRINE HYDROCHLORIDE 5 MG/1
5 TABLET ORAL
Status: DISCONTINUED | OUTPATIENT
Start: 2021-12-12 | End: 2021-12-13

## 2021-12-12 RX ORDER — AMOXICILLIN 250 MG
2 CAPSULE ORAL 2 TIMES DAILY
Status: DISCONTINUED | OUTPATIENT
Start: 2021-12-12 | End: 2021-12-15 | Stop reason: HOSPADM

## 2021-12-12 RX ADMIN — FUROSEMIDE 20 MG: 10 INJECTION, SOLUTION INTRAMUSCULAR; INTRAVENOUS at 10:42

## 2021-12-12 RX ADMIN — FAMOTIDINE 10 MG: 20 TABLET ORAL at 10:42

## 2021-12-12 RX ADMIN — ALBUTEROL SULFATE 2 PUFF: 90 AEROSOL, METERED RESPIRATORY (INHALATION) at 07:16

## 2021-12-12 RX ADMIN — ALBUTEROL SULFATE 2 PUFF: 90 AEROSOL, METERED RESPIRATORY (INHALATION) at 13:45

## 2021-12-12 RX ADMIN — AZITHROMYCIN MONOHYDRATE 500 MG: 250 TABLET ORAL at 13:38

## 2021-12-12 RX ADMIN — SENNOSIDES AND DOCUSATE SODIUM 2 TABLET: 50; 8.6 TABLET ORAL at 05:18

## 2021-12-12 RX ADMIN — FAMOTIDINE 10 MG: 20 TABLET ORAL at 16:43

## 2021-12-12 RX ADMIN — Medication 8 MG: at 21:00

## 2021-12-12 RX ADMIN — DEXAMETHASONE 6 MG: 6 TABLET ORAL at 05:19

## 2021-12-12 RX ADMIN — POLYETHYLENE GLYCOL 3350 1 PACKET: 17 POWDER, FOR SOLUTION ORAL at 10:42

## 2021-12-12 RX ADMIN — ALBUTEROL SULFATE 2 PUFF: 90 AEROSOL, METERED RESPIRATORY (INHALATION) at 02:22

## 2021-12-12 RX ADMIN — CEFTRIAXONE SODIUM 2 G: 2 INJECTION, POWDER, FOR SOLUTION INTRAMUSCULAR; INTRAVENOUS at 13:39

## 2021-12-12 RX ADMIN — GUAIFENESIN 600 MG: 600 TABLET, EXTENDED RELEASE ORAL at 13:38

## 2021-12-12 RX ADMIN — ENOXAPARIN SODIUM 40 MG: 40 INJECTION SUBCUTANEOUS at 05:18

## 2021-12-12 RX ADMIN — GUAIFENESIN 600 MG: 600 TABLET, EXTENDED RELEASE ORAL at 20:24

## 2021-12-12 RX ADMIN — GUAIFENESIN 600 MG: 600 TABLET, EXTENDED RELEASE ORAL at 05:18

## 2021-12-12 ASSESSMENT — ENCOUNTER SYMPTOMS
MYALGIAS: 1
ABDOMINAL PAIN: 0
COUGH: 1
WHEEZING: 0
FEVER: 0
CONSTIPATION: 1
BLURRED VISION: 0
CHILLS: 0
TINGLING: 0
NAUSEA: 0
WEAKNESS: 0
HEARTBURN: 0
DIARRHEA: 0
HEADACHES: 0
SORE THROAT: 0
DIZZINESS: 0
DOUBLE VISION: 0
HEMOPTYSIS: 0
BLOOD IN STOOL: 0
PALPITATIONS: 0
SPUTUM PRODUCTION: 1
VOMITING: 0
SHORTNESS OF BREATH: 0

## 2021-12-12 NOTE — ASSESSMENT & PLAN NOTE
Patient with complaints of dysuria and having difficulty emptying bladder on admission. Denies today. UA without sign of infection.

## 2021-12-12 NOTE — ASSESSMENT & PLAN NOTE
Likely CAP with underlying COVID pna. Patient with productive cough and elevated procal.    -Continue on C3 day 3/5 and azithromycin day 3/3

## 2021-12-12 NOTE — CARE PLAN
The patient is Stable - Low risk of patient condition declining or worsening    Shift Goals  Clinical Goals: maintain oxygen above 90  Patient Goals: comfort, have BM    Progress made toward(s) clinical / shift goals:  yes      Problem: Knowledge Deficit - Standard  Goal: Patient and family/care givers will demonstrate understanding of plan of care, disease process/condition, diagnostic tests and medications  Outcome: Progressing     Problem: Respiratory  Goal: Patient will achieve/maintain optimum respiratory ventilation and gas exchange  Outcome: Progressing

## 2021-12-12 NOTE — H&P
History & Physical Note    Date of Admission: 12/11/2021  Admission Status: Inpatient  [facility] Team: [facility]  Attending: Gina Lee MD  Senior Resident: Dr. Bell  Intern: Dr. Hall  Contact Number: [phone]    Chief Complaint:   Chief Complaint   Patient presents with   • Shortness of Breath     X 2 days, dx of Covid 12/7, sx started 12/5, pt went to  on 12/7, did not require O2, sent home, pt was 83% on RA at triage, VSS on 6L NC, denies medical hx   • Fever     X 6 days, high of 101F at home, afebrile at triage         History of Present Illness (HPI):   Rakan Beltrán is a 26-year-old male with no significant past medical history who presented to the ED on 12/11/2021 for worsening shortness of breath, cough and fevers.  He states that he was having difficulty walking short distances, coughing a lot to the point where he was going to pass out and had a fever last night up to 101°F . Patient stated that he started to feel flu-like symptoms around November 27.  States he was in contact with a cousin who was sick, and later tested positive for Covid 19.  Patient tested for Covid-19 on the 28th and received his positive result on December 5.  Patient is unvaccinated.  Patient states that he has been taking Tylenol on and off since the 28th for fevers.  His fiancée, Cheryle, who is by patient side says that he sometimes will get a little dazed and sleepy, which the patient responded he has not been getting any good sleep these past couple of days.  She also mentions that he makes noises at night and will sometimes gasp for air.     In the ED, patient was febrile with a temperature of 101.9 °F, nontachycardic with a pulse of 83, nontachypneic with a respiratory rate of 20, blood pressure of 130/75 and saturating at 83% on RA.  Significant labs include sodium level of 134, chloride level of 95,  glucose 120, AST 58, D-dimer 1.22 and procalcitonin of 0.47.  Covid test was positive.  Chest x-ray  showed bibasilar interstitial opacities which is consistent with COVID-19 pneumonia.  In the ED patient received PO Decadron 6 mg.     Review of Systems:   Review of Systems   Constitutional: Positive for fever and malaise/fatigue. Negative for chills, diaphoresis and weight loss.   HENT: Positive for congestion. Negative for ear discharge, ear pain and sore throat.    Eyes: Negative for pain and redness.   Respiratory: Positive for cough, sputum production and shortness of breath. Negative for hemoptysis and wheezing.    Cardiovascular: Positive for palpitations. Negative for chest pain, claudication and leg swelling.   Gastrointestinal: Positive for nausea. Negative for abdominal pain, blood in stool, constipation, diarrhea, melena and vomiting.   Genitourinary: Positive for dysuria. Negative for frequency, hematuria and urgency.   Musculoskeletal: Positive for myalgias. Negative for falls.   Neurological: Positive for dizziness, weakness and headaches.   Endo/Heme/Allergies: Does not bruise/bleed easily.   Psychiatric/Behavioral: The patient has insomnia.          Past Medical History:   Past Medical History was reviewed with patient.   has no past medical history on file.    Past Surgical History: Past Surgical History was reviewed with patient.   has no past surgical history on file.    Medications: Medications have been reviewed with patient.  Prior to Admission Medications   Prescriptions Last Dose Informant Patient Reported? Taking?   Ascorbic Acid (VITAMIN C PO) 12/10/2021 at Unknown time  Yes Yes   Sig: Take 1 Tablet by mouth every day.   Omega-3 Fatty Acids (FISH OIL) 1000 MG Cap capsule 12/10/2021 at Unknown time  Yes Yes   Sig: Take 1,000 mg by mouth every day.   albuterol 108 (90 Base) MCG/ACT Aero Soln inhalation aerosol 12/11/2021 at Unknown time  Yes Yes   Sig: Inhale 2 Puffs every 6 hours as needed.   multivitamin (THERAGRAN) Tab 12/10/2021 at Unknown time  Yes Yes   Sig: Take 1 Tablet by mouth  every day.      Facility-Administered Medications: None        Allergies: Allergies have been reviewed with patient.  No Known Allergies    Family History:   Type 2 diabetes: mother  HTN: father, mother     Social History:   Tobacco: Denies smoking or chewing tobacco.  States that he has vaped for 6 or 8 months.  One cartridge lasts him 3 months.  Alcohol: Denies alcohol use  Recreational drugs (illegal and prescription): Denies any recreational drug use  Employment: Works [employer]  Activity Level: Active  Living situation: Lives in [place] with his Cheryle coffey, and their Anshu  Recent travel: Traveled to [place] 2 weeks ago  Primary Care Provider: reviewed Pcp Pt States None  Other (stressors, spirituality, exposures): Has to contact .  Patient was incarcerated 1 year ago  Physical Exam: Vitals:  Temp:  [36.5 °C (97.7 °F)-38.8 °C (101.9 °F)] 36.5 °C (97.7 °F)  Pulse:  [] 81  Resp:  [18-20] 18  BP: (106-130)/(65-75) 111/65  SpO2:  [83 %-96 %] 93 %    Physical Exam  Constitutional:       General: He is not in acute distress.     Appearance: He is ill-appearing. He is not toxic-appearing or diaphoretic.   HENT:      Head: Normocephalic and atraumatic.      Right Ear: External ear normal.      Left Ear: External ear normal.   Eyes:      Extraocular Movements: Extraocular movements intact.      Conjunctiva/sclera: Conjunctivae normal.      Pupils: Pupils are equal, round, and reactive to light.   Cardiovascular:      Rate and Rhythm: Normal rate and regular rhythm.      Pulses: Normal pulses.      Heart sounds: Normal heart sounds. No murmur heard.      Pulmonary:      Effort: Pulmonary effort is normal. No respiratory distress.      Breath sounds: No stridor. Rales present. No wheezing or rhonchi.      Comments: Coarse crackles in lower lung bases  Chest:      Chest wall: No tenderness.   Abdominal:      General: Abdomen is flat. Bowel sounds are normal. There is no  distension.      Palpations: Abdomen is soft.      Tenderness: There is no abdominal tenderness.   Musculoskeletal:         General: No swelling or tenderness. Normal range of motion.      Cervical back: Normal range of motion and neck supple. No rigidity or tenderness.   Lymphadenopathy:      Cervical: No cervical adenopathy.   Skin:     General: Skin is warm and dry.      Coloration: Skin is not jaundiced or pale.      Findings: No erythema or rash.   Neurological:      General: No focal deficit present.      Mental Status: He is alert and oriented to person, place, and time. Mental status is at baseline.      Motor: No weakness.   Psychiatric:         Mood and Affect: Mood normal.         Behavior: Behavior normal.         Labs:   Most Recent Labs:    Lab Results   Component Value Date/Time    WBC 10.4 12/11/2021 11:06 AM    RBC 5.90 12/11/2021 11:06 AM    HEMOGLOBIN 16.6 12/11/2021 11:06 AM    HEMATOCRIT 49.4 12/11/2021 11:06 AM    MCV 83.7 12/11/2021 11:06 AM    MCH 28.1 12/11/2021 11:06 AM    MCHC 33.6 (L) 12/11/2021 11:06 AM    MPV 10.5 12/11/2021 11:06 AM    NEUTSPOLYS 89.70 (H) 12/11/2021 11:06 AM    LYMPHOCYTES 5.30 (L) 12/11/2021 11:06 AM    MONOCYTES 4.00 12/11/2021 11:06 AM    EOSINOPHILS 0.00 12/11/2021 11:06 AM    BASOPHILS 0.20 12/11/2021 11:06 AM      Lab Results   Component Value Date/Time    SODIUM 134 (L) 12/11/2021 11:06 AM    POTASSIUM 4.0 12/11/2021 11:06 AM    CHLORIDE 95 (L) 12/11/2021 11:06 AM    CO2 24 12/11/2021 11:06 AM    GLUCOSE 120 (H) 12/11/2021 11:06 AM    BUN 14 12/11/2021 11:06 AM    CREATININE 1.04 12/11/2021 11:06 AM      Lab Results   Component Value Date/Time    ALTSGPT 50 12/11/2021 11:06 AM    ASTSGOT 58 (H) 12/11/2021 11:06 AM    ALKPHOSPHAT 77 12/11/2021 11:06 AM    TBILIRUBIN 0.7 12/11/2021 11:06 AM    ALBUMIN 3.8 12/11/2021 11:06 AM    GLOBULIN 3.4 12/11/2021 11:06 AM     No results found for: PROTHROMBTM, INR         EKG: None    Imaging:   DX-CHEST-PORTABLE (1 VIEW)    Final Result      Bibasilar interstitial opacities consistent with Covid 19 pneumonia.            Previous Data Review: reviewed   Rakan Beltrán is a 26-year-old male with no significant past medical history who presented to the ED on 12/11/2021 for worsening shortness of breath, cough and fevers.  Symptoms started 11/27 and patient tested for Covid 11/28 and received positive results on 12/5.  Patient is unvaccinated.  Admitted to the hospital for acute respiratory failure with hypoxia likely secondary to Covid pneumonia.    * Acute respiratory failure with hypoxia (HCC)- (present on admission)  Assessment & Plan  Acute respiratory failure with hypoxia likely secondary to Covid pneumonia   Flu-like symptoms began around 11/27  Tested for Covid on 11/28 and received positive result on 12/5  Unvaccinated  Due to it being more than 10 days from symptom onset, patient no longer eligible for remdesivir  Will treat symptomatically     -Admit to hospital   -Monitor on telemetry  -Continuous pulse ox  -RT protocol  -Incentive spirometer  -Dexamethasone 6 mg p.o. for 10 days       Pneumonia due to COVID-19 virus- (present on admission)  Assessment & Plan  Patient was at 83% on room air in ED triage  Currently requiring 6 L of oxygen nasal cannula  Chest x-ray showed bibasilar interstitial opacities consistent with COVID-19 pneumonia  Covid +, unvaccinated  Pro-calcitonin elevated, no suspicion currently for bacterial infection, no need for antibiotics  AST mildly elevated      -Continuous oxygen  -Monitor on telemetry  -Isolation precautions for COVID  -Continuous pulse ox  -Continuous oxygen as needed  -DVT prophylaxis enoxaparin 40 mg, TEDs  -RT protocol  -Incentive spirometer  -Dexamethasone 6 mg p.o. for 10 days   -Albuterol inhaler q4h as needed for shortness of breath  -Robitussin 10 mL as needed for cough  -Tylenol 650 mg as needed for fever  -Zofran 4 mg as needed for nausea  -Daily CBC to monitor for  leukocytosis  -Daily CMP to monitor electrolytes, glucose, LFTs      Dysuria- (present on admission)  Assessment & Plan  Patient complains of dysuria and having difficulty emptying bladder     -Will obtain UA     DVT prophylaxis: TEDs, Enoxaparin 40 mg  CODE STATUS: Full code  Disposition: Inpatient

## 2021-12-12 NOTE — DIETARY
"Nutrition services: Day 1 of admit.  Rakan Beltrán is a 26 y.o. male with admitting DX of Pneumonia due to COVID-19 virus.  Consult received for Malnutrition Screening Tool score of 3 for unsure of unplanned weight loss and poor appetite.    Assessment:  Height: 172.7 cm (5' 8\")  Weight: 89.9 kg (198 lb 3.1 oz)  Body mass index is 30.14 kg/m²., BMI classification: Class 1 Obesity  Diet/Intake: Regular    Evaluation:   1. Pt admitted with COVID-19 pneumonia. RD not entering enhanced droplet isolation per department guidelines. Attempted phone calls with no answer.  2. Per chart review, pt's weight at recent visit to [facility] ED = 92.5 kg on 12/8/21. Pt with 2.8% weight loss x 4 days which is severe if current bed scale weight is correct.  3. Recorded PO intake at breakfast today was good at %. Unable to assess adequacy of PO intake PTA.    Malnutrition Risk: 2.8% weight loss x 4 days per chart review, but unable to fully assess adequacy of PO intake PTA and pt ate % of breakfast today.    Recommendations/Plan:   1. Encourage continued good intake of >50%.  2. Document intake of all meals as % taken in ADL's to provide interdisciplinary communication across all shifts.   3. Monitor weight.  4. Nutrition rep will continue to see patient for ongoing meal and snack preferences.     RD following            "

## 2021-12-12 NOTE — ASSESSMENT & PLAN NOTE
Unvaccinated. Positive COVID test 12/05/21. CXR with bibasilar interstitial opacities indicating COVID pneumonia. Requiring 6L NC on admission; saturating 83% on RA in ED. ABG not concerning. LA and BNP wnl. Procal, CRP, and LDH elevated. D-dimer 1.22; Wells 3, no concern for PE at this time.    -Continue dexamethasone 6mg daily; day 4/10  -Does not meet requirements for remdesivir at this time; ordered TB quant in case of remdesivir/baricitinib need   -Continue on famotidine 10mg bid  -Trial of lasix 20mg IV 12/13/21 with 2.1L output  -Encouraged incentive spirometry and proning

## 2021-12-12 NOTE — H&P
"                                                 Senior Admit Note     Mr. Beltrán is a 26 y.o. male with no significant past medical history presented with worsening shortness of breath and fatigue.  Patient states that he started feeling sick around 27 Novemberand got tested for Covid on 28th. He received the results as Covid positive on 5th of December.  Patient is here with his fiancée who mentioned above he is having intermittent fever and chills for which he is taking Tylenol.  Last night he had a fever of 101.  Patient also complaining about having progressively worsening cough with yellowish sputum.  Coughing bouts are associated with abdominal pain and headaches.   As per fiancé at night he has been making gasping and grunting sounds.  Yesterday he tried to ambulate to the bathroom which caused him very short of breath, gasping for air and almost passed out.  Denies any episodes of syncope, chest pain.  Review of system also positive for extreme fatigue, myalgias, headache, nausea, decreased appetite and lethargy.  In the ED patient was desatting to 83% on room air but was requiring about 4 L of oxygen at rest.  As per chart review he needed 6 L of oxygen when he was walking to the room.  No leukocytosis, normal lactic acid.  D-dimer 1.22.  Pro-Eliezer slightly elevated 0.47.Chest x-ray shows bibasilar interstitial opacities consistent with Covid pneumonia    Social history  No cigarette smoking or tobacco use but patient does vape 1 cartridge (every 3 months).  Denies alcohol use, meth use or marijuana.  Patient works at [employer] in the [employer].  Lives with his fiance.  Patient is on probation currently     Exam:  /65   Pulse 78   Temp 36.5 °C (97.7 °F)   Resp 20   Ht 1.727 m (5' 8\")   Wt 94.3 kg (208 lb)   SpO2 92%   BMI 31.63 kg/m²     Physical exam:   General: comfortable,not in acute distress  HEENT: NC/AT. PERRLA, EOMI. moist mucous membranes. No lymphadenopathy.  CVS: RRR, S1S2 WNL, no " MRG  RS: Bilateral crackles, heard up to mid chest on both sides.  Coughing bouts with deep inspiration  Abdomen: Soft, NT/ND, BS +. No organomegaly  Ext: No pedal edema  Neuro: Alert oriented x4 CN 2-12 wnl. Motor and sensory exam wnl.    DX-CHEST-PORTABLE (1 VIEW)   Final Result      Bibasilar interstitial opacities consistent with Covid 19 pneumonia.          Assessment and Plan:    This is a 26-year-old male who is unvaccinated for COVID-19 presented with worsening shortness of breath, fever, chills, extreme fatigue, myalgias and nausea after testing positive for COVID-19.  Symptoms present since 11/27/2021.  Patient now requiring 4 L of oxygen at rest and 6 L on ambulation.    1.  Acute hypoxic respiratory failure likely due to Covid pneumonia  2. COVID-19 positive test (U07.1, COVID-19) with Acute Pneumonia (J12.89, Other viral pneumonia)    Will admit patient to telemetry floor  Continuous pulse ox monitoring  Supplemental oxygen as needed  Isolation precautions for Covid  Patient is started on dexamethasone 6 mg for total 10 days.  Patient has symptoms present for more than 10 days, does not qualify for remdesivir treatment.  At present I do not suspect any secondary bacterial infection.  We will not start any antibacterial treatment.  We will get an EKG.  Caution with fluids.  Symptomatic treatment with Tylenol, Zofran and guaifenesin as needed  RT protocol with albuterol inhaler versus Duo Neb, Incentive spirometer  Consult Home monitoring for COVID 19 on discharge    Bowel protocol  Lovenox for DVT prophylaxis  Full code        For further details , please refer to H&P by Dr. Hall

## 2021-12-12 NOTE — ASSESSMENT & PLAN NOTE
Patient was at 83% on room air in ED triage  Currently requiring 6 L of oxygen nasal cannula  Chest x-ray showed bibasilar interstitial opacities consistent with COVID-19 pneumonia  Covid +, unvaccinated  Pro-calcitonin elevated, no suspicion currently for bacterial infection, no need for antibiotics  AST mildly elevated      -Continuous oxygen  -Monitor on telemetry  -Isolation precautions for COVID  -Continuous pulse ox  -Continuous oxygen as needed  -DVT prophylaxis enoxaparin 40 mg, TEDs  -RT protocol  -Incentive spirometer  -Dexamethasone 6 mg p.o. for 10 days   -Albuterol inhaler q4h as needed for shortness of breath  -Robitussin 10 mL as needed for cough  -Tylenol 650 mg as needed for fever  -Zofran 4 mg as needed for nausea  -Daily CBC to monitor for leukocytosis  -Daily CMP to monitor electrolytes, glucose, LFTs

## 2021-12-12 NOTE — DISCHARGE PLANNING
CM received order for home monitoring program. It is too late to set up today and Dr Bell was notified via [employer].    He anticipates inpt stay for 2-3 days then discharge home on RPM program. He will dc current order and re-enter when pt is ready to discharge.

## 2021-12-12 NOTE — CARE PLAN
Problem: Knowledge Deficit - Standard  Goal: Patient and family/care givers will demonstrate understanding of plan of care, disease process/condition, diagnostic tests and medications  Outcome: Progressing     Problem: Hemodynamics  Goal: Patient's hemodynamics, fluid balance and neurologic status will be stable or improve  Outcome: Progressing     Problem: Respiratory  Goal: Patient will achieve/maintain optimum respiratory ventilation and gas exchange  Outcome: Progressing      Shift Goals  Clinical Goals: [place] adequate oxygenation   Patient Goals: rest      Patient admitted from ER. AO x 4, V/S stable. Denies pain. On 4LPM NC . Denies SOB . Educated to use call light and fall prevention.

## 2021-12-12 NOTE — ASSESSMENT & PLAN NOTE
Likely 2/2 to COVID pna c/b bacterial pna. Tested for COVID on 11/28/21 due to close contact, symptom onset on 12/03/21, and positive test result on 12/05/21. Unvaccinated.    -RT protocol  -Continue dexamethasone; refer to COVID pna plan  -Continue on C3/azithro; refer to CAP plan  -Continue on guaifenisin for productive cough  -Continue albuterol per RT  -O2 evaluation 12/14/21, requiring 3L at rest and 8L while ambulating; not stable for discharge at this time

## 2021-12-12 NOTE — PROGRESS NOTES
Daily Progress Note:     Date of Service: 12/12/2021  Primary Team: [facility] Team   Attending: Cam Carroll M.D.   Senior Resident: Dr. Prabhakar Hoang MD  Intern: Dr. Dewayne Santos MD  Contact:  [phone]    Chief Complaint:   Shortness of breath, fever, and cough    Subjective: Patient is a 25yo male with no significant PMH presenting with shortness of breath, cough, and fever. Tested for COVID on 11/28/21 due to close contact, symptom onset on 12/03/21, and positive test result on 12/05/21. Unvaccinated. Admitted for AHRF 2/2 COVID pneumonia.    No acute events overnight. Patient states that they feel okay. Endorses loss of smell and taste. Has a productive cough; no blood or rust color. Endorses constipation for 4 days. No other complaints at this time.    Initially patient was found to be on 10L oxymask, desaturating at 87%. Increased to 12L. Again desaturated after physical exam, without improvement for several minutes until increased to 15L. Later in the morning while rounding, patient saturating well on 4L. Placing on NC with humidifier.    Consultants/Specialty:  N/A    Review of Systems:    Review of Systems   Constitutional: Negative for chills and fever.   HENT: Negative for ear pain and sore throat.    Eyes: Negative for blurred vision and double vision.   Respiratory: Positive for cough and sputum production. Negative for hemoptysis, shortness of breath and wheezing.    Cardiovascular: Negative for chest pain and palpitations.   Gastrointestinal: Positive for constipation. Negative for abdominal pain, blood in stool, diarrhea, heartburn, melena, nausea and vomiting.   Genitourinary: Negative for dysuria and hematuria.   Musculoskeletal: Positive for myalgias.   Skin: Negative for itching and rash.   Neurological: Negative for dizziness, tingling, weakness and headaches.       Objective Data:   Physical Exam:   Vitals:   Temp:  [36.1 °C (97 °F)-36.7 °C (98 °F)] 36.3 °C (97.4  °F)  Pulse:  [] 77  Resp:  [18-20] 18  BP: ()/(48-73) 94/48  SpO2:  [87 %-95 %] 92 %     Physical Exam  Constitutional:       General: He is not in acute distress.     Appearance: He is not ill-appearing or toxic-appearing.   HENT:      Head: Normocephalic and atraumatic.      Mouth/Throat:      Mouth: Mucous membranes are moist.      Pharynx: Oropharynx is clear. No oropharyngeal exudate or posterior oropharyngeal erythema.   Eyes:      General: No scleral icterus.     Extraocular Movements: Extraocular movements intact.      Conjunctiva/sclera: Conjunctivae normal.      Pupils: Pupils are equal, round, and reactive to light.   Cardiovascular:      Rate and Rhythm: Normal rate and regular rhythm.      Heart sounds: Normal heart sounds. No murmur heard.  No friction rub. No gallop.    Pulmonary:      Effort: Pulmonary effort is normal. No respiratory distress.      Breath sounds: Rales (b/l mid to low lung crackles) present. No wheezing or rhonchi.   Abdominal:      General: Abdomen is flat. There is no distension.      Palpations: Abdomen is soft. There is no mass.      Tenderness: There is no abdominal tenderness.   Musculoskeletal:         General: No swelling or tenderness. Normal range of motion.      Cervical back: Normal range of motion and neck supple. No rigidity or tenderness.   Lymphadenopathy:      Cervical: No cervical adenopathy.   Skin:     General: Skin is warm and dry.      Coloration: Skin is not jaundiced.   Neurological:      Mental Status: He is alert and oriented to person, place, and time.      Cranial Nerves: No cranial nerve deficit.           Labs:   Recent Labs     12/11/21  1106 12/12/21  0159   WBC 10.4 10.5   RBC 5.90 5.56   HEMOGLOBIN 16.6 15.9   HEMATOCRIT 49.4 46.1   MCV 83.7 82.9   MCH 28.1 28.6   RDW 37.3 37.3   PLATELETCT 161* 160*   MPV 10.5 10.5   NEUTSPOLYS 89.70* 88.30*   LYMPHOCYTES 5.30* 5.90*   MONOCYTES 4.00 4.80   EOSINOPHILS 0.00 0.00   BASOPHILS 0.20 0.10      Recent Labs     12/11/21  1106 12/12/21  0159   SODIUM 134* 138   POTASSIUM 4.0 4.6   CHLORIDE 95* 100   CO2 24 27   GLUCOSE 120* 133*   BUN 14 14     Recent Labs     12/11/21  1106 12/12/21  0159   ALBUMIN 3.8 4.0   TBILIRUBIN 0.7 0.5   ALKPHOSPHAT 77 71   TOTPROTEIN 7.2 6.9   ALTSGPT 50 43   ASTSGOT 58* 44   CREATININE 1.04 0.74       Imaging:   DX-CHEST-PORTABLE (1 VIEW)   Final Result      Persistent bilateral peripheral interstitial opacities with a basilar predominance consistent with Covid 19 pneumonia.      DX-CHEST-PORTABLE (1 VIEW)   Final Result      Bibasilar interstitial opacities consistent with Covid 19 pneumonia.        A&P  Patient is a 25yo male with no significant PMH presenting with shortness of breath, cough, and fever. Tested for COVID on 11/28/21 due to close contact, symptom onset on 12/03/21, and positive test result on 12/05/21. Unvaccinated. Admitted for AHRF 2/2 COVID pneumonia.    * Acute respiratory failure with hypoxia (HCC)- (present on admission)  Assessment & Plan  Likely 2/2 to COVID pna c/b bacterial pna. Tested for COVID on 11/28/21 due to close contact, symptom onset on 12/03/21, and positive test result on 12/05/21. Unvaccinated.    -RT protocol  -Continue dexamethasone day 2/10; refer to COVID pna plan  -Started on C3/azithro; refer to CAP plan  -Continue on guaifenisin for productive cough  -Continue albuterol per RT    Community acquired bacterial pneumonia  Assessment & Plan  Likely CAP with underlying COVID pna. Patient with productive cough and elevated procal.    -Started on C3 for 5 days and azithro for 3 days    Pneumonia due to COVID-19 virus- (present on admission)  Assessment & Plan  Unvaccinated. Positive COVID test 12/05/21. CXR with bibasilar interstitial opacities indicating COVID pneumonia. Requiring 6L NC on admission; saturating 83% on RA in ED. ABG not concerning. LA and BNP wnl. Procal, CRP, and LDH elevated. D-dimer 1.22; Wells 3, no concern for PE at  this time.    -Continue dexamethasone 6mg daily; day 2/10  -Does not meet requirements for remdesivir at this time; ordered TB quant in case of remdesivir/baricitinib need  -Started on famotidine 10mg bid  -Trial of lasix 20mg IV x1 today  -Started on midodrine 5mg tid due to hypotension after lasix; 94/48  -Encouraged incentive spirometry and proning    Constipation  Assessment & Plan  Last BM 12/08/21    -Scheduled miralax    Dysuria- (present on admission)  Assessment & Plan  Patient with complaints of dysuria and having difficulty emptying bladder on admission. Denies today. UA without sign of infection.    DVT ppx; enoxaparin  GI ppx: famotidine  Abx: C3/azithro  Code status: FULL CODE  Dispo: Continuing inpatient stay for COVID pna c/b CAP.

## 2021-12-12 NOTE — ASSESSMENT & PLAN NOTE
Acute respiratory failure with hypoxia likely secondary to Covid pneumonia   Flu-like symptoms began around 11/27  Tested for Covid on 11/28 and received positive result on 12/5  Unvaccinated  Due to it being more than 10 days from symptom onset, patient no longer eligible for remdesivir  Will treat symptomatically     -Admit to hospital   -Monitor on telemetry  -Continuous pulse ox  -RT protocol  -Incentive spirometer  -Dexamethasone 6 mg p.o. for 10 days

## 2021-12-12 NOTE — PROGRESS NOTES
Educated pt about repositioning, proning. Incentive spirometer. Pt verbalized understanding. Currently on 6 liter with 94% lying on his side. Last BM was 3 days ago. Miralax given.

## 2021-12-13 LAB
GAMMA INTERFERON BACKGROUND BLD IA-ACNC: 0.03 IU/ML
M TB IFN-G BLD-IMP: ABNORMAL
M TB IFN-G CD4+ BCKGRND COR BLD-ACNC: 0.02 IU/ML
MITOGEN IGNF BCKGRD COR BLD-ACNC: 0.12 IU/ML
QFT TB2 - NIL TBQ2: 0.04 IU/ML

## 2021-12-13 PROCEDURE — 700102 HCHG RX REV CODE 250 W/ 637 OVERRIDE(OP)

## 2021-12-13 PROCEDURE — 700102 HCHG RX REV CODE 250 W/ 637 OVERRIDE(OP): Performed by: EMERGENCY MEDICINE

## 2021-12-13 PROCEDURE — 700111 HCHG RX REV CODE 636 W/ 250 OVERRIDE (IP): Performed by: STUDENT IN AN ORGANIZED HEALTH CARE EDUCATION/TRAINING PROGRAM

## 2021-12-13 PROCEDURE — A9270 NON-COVERED ITEM OR SERVICE: HCPCS | Performed by: STUDENT IN AN ORGANIZED HEALTH CARE EDUCATION/TRAINING PROGRAM

## 2021-12-13 PROCEDURE — 99232 SBSQ HOSP IP/OBS MODERATE 35: CPT | Mod: GC | Performed by: HOSPITALIST

## 2021-12-13 PROCEDURE — 770020 HCHG ROOM/CARE - TELE (206)

## 2021-12-13 PROCEDURE — 700111 HCHG RX REV CODE 636 W/ 250 OVERRIDE (IP)

## 2021-12-13 PROCEDURE — 700105 HCHG RX REV CODE 258: Performed by: STUDENT IN AN ORGANIZED HEALTH CARE EDUCATION/TRAINING PROGRAM

## 2021-12-13 PROCEDURE — A9270 NON-COVERED ITEM OR SERVICE: HCPCS

## 2021-12-13 PROCEDURE — A9270 NON-COVERED ITEM OR SERVICE: HCPCS | Performed by: EMERGENCY MEDICINE

## 2021-12-13 PROCEDURE — 700102 HCHG RX REV CODE 250 W/ 637 OVERRIDE(OP): Performed by: STUDENT IN AN ORGANIZED HEALTH CARE EDUCATION/TRAINING PROGRAM

## 2021-12-13 RX ORDER — CHOLECALCIFEROL (VITAMIN D3) 125 MCG
5 CAPSULE ORAL NIGHTLY
Status: DISCONTINUED | OUTPATIENT
Start: 2021-12-13 | End: 2021-12-15 | Stop reason: HOSPADM

## 2021-12-13 RX ORDER — FUROSEMIDE 10 MG/ML
20 INJECTION INTRAMUSCULAR; INTRAVENOUS ONCE
Status: COMPLETED | OUTPATIENT
Start: 2021-12-13 | End: 2021-12-13

## 2021-12-13 RX ADMIN — CEFTRIAXONE SODIUM 2 G: 2 INJECTION, POWDER, FOR SOLUTION INTRAMUSCULAR; INTRAVENOUS at 04:59

## 2021-12-13 RX ADMIN — FAMOTIDINE 10 MG: 20 TABLET ORAL at 17:23

## 2021-12-13 RX ADMIN — AZITHROMYCIN MONOHYDRATE 500 MG: 250 TABLET ORAL at 04:57

## 2021-12-13 RX ADMIN — SENNOSIDES AND DOCUSATE SODIUM 2 TABLET: 50; 8.6 TABLET ORAL at 04:57

## 2021-12-13 RX ADMIN — MIDODRINE HYDROCHLORIDE 5 MG: 5 TABLET ORAL at 10:27

## 2021-12-13 RX ADMIN — GUAIFENESIN 600 MG: 600 TABLET, EXTENDED RELEASE ORAL at 22:29

## 2021-12-13 RX ADMIN — DEXAMETHASONE 6 MG: 6 TABLET ORAL at 04:58

## 2021-12-13 RX ADMIN — Medication 5 MG: at 22:29

## 2021-12-13 RX ADMIN — ENOXAPARIN SODIUM 40 MG: 40 INJECTION SUBCUTANEOUS at 04:59

## 2021-12-13 RX ADMIN — GUAIFENESIN 600 MG: 600 TABLET, EXTENDED RELEASE ORAL at 04:57

## 2021-12-13 RX ADMIN — MIDODRINE HYDROCHLORIDE 5 MG: 5 TABLET ORAL at 07:21

## 2021-12-13 RX ADMIN — ALBUTEROL SULFATE 2 PUFF: 90 AEROSOL, METERED RESPIRATORY (INHALATION) at 19:58

## 2021-12-13 RX ADMIN — ALBUTEROL SULFATE 2 PUFF: 90 AEROSOL, METERED RESPIRATORY (INHALATION) at 08:00

## 2021-12-13 RX ADMIN — ALBUTEROL SULFATE 2 PUFF: 90 AEROSOL, METERED RESPIRATORY (INHALATION) at 14:08

## 2021-12-13 RX ADMIN — FAMOTIDINE 10 MG: 20 TABLET ORAL at 04:58

## 2021-12-13 RX ADMIN — FUROSEMIDE 20 MG: 10 INJECTION, SOLUTION INTRAMUSCULAR; INTRAVENOUS at 09:13

## 2021-12-13 RX ADMIN — GUAIFENESIN 600 MG: 600 TABLET, EXTENDED RELEASE ORAL at 14:08

## 2021-12-13 ASSESSMENT — ENCOUNTER SYMPTOMS
SHORTNESS OF BREATH: 1
MYALGIAS: 0
BLOOD IN STOOL: 0
NAUSEA: 0
DIZZINESS: 0
WHEEZING: 0
HEARTBURN: 0
ABDOMINAL PAIN: 0
TINGLING: 0
DOUBLE VISION: 0
DIARRHEA: 0
CONSTIPATION: 0
WEAKNESS: 0
SPUTUM PRODUCTION: 1
VOMITING: 0
SORE THROAT: 0
HEMOPTYSIS: 0
COUGH: 1
PALPITATIONS: 0
BLURRED VISION: 0
HEADACHES: 0
CHILLS: 0
FEVER: 0

## 2021-12-13 NOTE — INFECTION CONTROL
This patient is considered COVID RECOVERED.    Patient initially tested positive for COVID on 12/4/2021 with symptom onset 12/3/2021.  Patient is greater than or equal to 10 days from symptom onset and/or positive test.  Per the CDC guidance, this patient no longer requires transmission based precautions.  Patient may be placed on any unit per the bed assignment policy, including placement in a semi-private room with a roommate.

## 2021-12-13 NOTE — CARE PLAN
The patient is Watcher - Medium risk of patient condition declining or worsening    Shift Goals  Clinical Goals: Keep O2 sats > 90%  Patient Goals: keep comfortable.    Progress made toward(s) clinical / shift goals:  progressing    Patient is not progressing towards the following goals:      Problem: Knowledge Deficit - Standard  Goal: Patient and family/care givers will demonstrate understanding of plan of care, disease process/condition, diagnostic tests and medications  Outcome: Progressing     Problem: Psychosocial  Goal: Patient's level of anxiety will decrease  Outcome: Progressing  Goal: Patient and family will demonstrate ability to cope with life altering diagnosis and/or procedure  Outcome: Progressing     Problem: Respiratory  Goal: Patient will achieve/maintain optimum respiratory ventilation and gas exchange  Outcome: Progressing

## 2021-12-13 NOTE — PROGRESS NOTES
Daily Progress Note:     Date of Service: 12/13/2021  Primary Team: [facility]   Attending: Raymond Belcher M.D.   Senior Resident: Dr. Bhaskar Moore MD  Intern: Dr. Dewayne Santos MD  Contact:  [phone]    Chief Complaint:   Shortness of breath, fever, and cough    Subjective: Patient is a 25yo male with no significant PMH presenting with shortness of breath, cough, and fever. Tested for COVID on 11/28/21 due to close contact, symptom onset on 12/03/21, and positive test result on 12/05/21. Unvaccinated. Admitted for AHRF 2/2 COVID pneumonia.    No acute events overnight. On 6L NC. Saturating at 90% while on RA relaxing in bed, falling to 85% after physical exam. Patient states that they feel good and have been laying on their stomach often. Still has productive cough without blood or rust color. States they had a BM yesterday without hematochezia or melena. Patient states that they are short of breath with activity.     Consultants/Specialty:  N/A    Review of Systems:    Review of Systems   Constitutional: Negative for chills and fever.   HENT: Negative for ear pain and sore throat.    Eyes: Negative for blurred vision and double vision.   Respiratory: Positive for cough, sputum production and shortness of breath (with activity). Negative for hemoptysis and wheezing.    Cardiovascular: Negative for chest pain and palpitations.   Gastrointestinal: Negative for abdominal pain, blood in stool, constipation, diarrhea, heartburn, melena, nausea and vomiting.   Genitourinary: Negative for dysuria and hematuria.   Musculoskeletal: Negative for myalgias.   Skin: Negative for itching and rash.   Neurological: Negative for dizziness, tingling, weakness and headaches.       Objective Data:   Physical Exam:   Vitals:   Temp:  [35.8 °C (96.5 °F)-36.6 °C (97.8 °F)] 36.1 °C (96.9 °F)  Pulse:  [60-84] 60  Resp:  [18] 18  BP: (103-110)/(53-65) 108/59  SpO2:  [91 %-95 %] 91 %     Physical Exam  Constitutional:        General: He is not in acute distress.     Appearance: He is not ill-appearing or toxic-appearing.   HENT:      Head: Normocephalic and atraumatic.      Mouth/Throat:      Mouth: Mucous membranes are moist.      Pharynx: Oropharynx is clear. No oropharyngeal exudate or posterior oropharyngeal erythema.   Eyes:      General: No scleral icterus.     Extraocular Movements: Extraocular movements intact.      Conjunctiva/sclera: Conjunctivae normal.      Pupils: Pupils are equal, round, and reactive to light.   Cardiovascular:      Rate and Rhythm: Normal rate and regular rhythm.      Heart sounds: Normal heart sounds. No murmur heard.  No friction rub. No gallop.    Pulmonary:      Effort: Pulmonary effort is normal. No respiratory distress.      Breath sounds: Wheezing (diffuse expiratory) and rales (b/l mid to low lung crackles) present. No rhonchi.   Abdominal:      General: Abdomen is flat. There is no distension.      Palpations: Abdomen is soft. There is no mass.      Tenderness: There is no abdominal tenderness.   Musculoskeletal:         General: No swelling or tenderness. Normal range of motion.      Cervical back: Normal range of motion and neck supple. No rigidity or tenderness.   Lymphadenopathy:      Cervical: No cervical adenopathy.   Skin:     General: Skin is warm and dry.      Coloration: Skin is not jaundiced.   Neurological:      Mental Status: He is alert and oriented to person, place, and time.      Cranial Nerves: No cranial nerve deficit.           Labs:   Recent Labs     12/11/21  1106 12/12/21  0159   WBC 10.4 10.5   RBC 5.90 5.56   HEMOGLOBIN 16.6 15.9   HEMATOCRIT 49.4 46.1   MCV 83.7 82.9   MCH 28.1 28.6   RDW 37.3 37.3   PLATELETCT 161* 160*   MPV 10.5 10.5   NEUTSPOLYS 89.70* 88.30*   LYMPHOCYTES 5.30* 5.90*   MONOCYTES 4.00 4.80   EOSINOPHILS 0.00 0.00   BASOPHILS 0.20 0.10     Recent Labs     12/11/21  1106 12/12/21  0159   SODIUM 134* 138   POTASSIUM 4.0 4.6   CHLORIDE 95* 100   CO2 24  27   GLUCOSE 120* 133*   BUN 14 14     Recent Labs     12/11/21  1106 12/12/21  0159   ALBUMIN 3.8 4.0   TBILIRUBIN 0.7 0.5   ALKPHOSPHAT 77 71   TOTPROTEIN 7.2 6.9   ALTSGPT 50 43   ASTSGOT 58* 44   CREATININE 1.04 0.74       Imaging:   DX-CHEST-PORTABLE (1 VIEW)   Final Result      Persistent bilateral peripheral interstitial opacities with a basilar predominance consistent with Covid 19 pneumonia.      DX-CHEST-PORTABLE (1 VIEW)   Final Result      Bibasilar interstitial opacities consistent with Covid 19 pneumonia.        A&P  Patient is a 25yo male with no significant PMH presenting with shortness of breath, cough, and fever. Tested for COVID on 11/28/21 due to close contact, symptom onset on 12/03/21, and positive test result on 12/05/21. Unvaccinated. Admitted for AHRF 2/2 COVID pneumonia.    * Acute respiratory failure with hypoxia (HCC)- (present on admission)  Assessment & Plan  Likely 2/2 to COVID pna c/b bacterial pna. Tested for COVID on 11/28/21 due to close contact, symptom onset on 12/03/21, and positive test result on 12/05/21. Unvaccinated.    -RT protocol  -Continue dexamethasone; refer to COVID pna plan  -Continue on C3/azithro; refer to CAP plan  -Continue on guaifenisin for productive cough  -Continue albuterol per RT    Community acquired bacterial pneumonia  Assessment & Plan  Likely CAP with underlying COVID pna. Patient with productive cough and elevated procal.    -Continue on C3 day 2/5 and azithromycin day 2/3    Pneumonia due to COVID-19 virus- (present on admission)  Assessment & Plan  Unvaccinated. Positive COVID test 12/05/21. CXR with bibasilar interstitial opacities indicating COVID pneumonia. Requiring 6L NC on admission; saturating 83% on RA in ED. ABG not concerning. LA and BNP wnl. Procal, CRP, and LDH elevated. D-dimer 1.22; Wells 3, no concern for PE at this time.    -Continue dexamethasone 6mg daily; day 3/10  -Does not meet requirements for remdesivir at this time; ordered  TB quant in case of remdesivir/baricitinib need  -Continue on famotidine 10mg bid  -Trial of lasix 20mg IV x1 today  -Strict I&Os; 1L output first hour after lasix today  -Discontinued midodrine; never received a dose, has not been hypotensive or symptomatic  -Encouraged incentive spirometry and proning    Constipation  Assessment & Plan  Last BM 12/08/21 on admission    -Scheduled miralax; had BM yesterday after receiving miralax    Dysuria- (present on admission)  Assessment & Plan  Patient with complaints of dysuria and having difficulty emptying bladder on admission. Denies today. UA without sign of infection.    DVT ppx; enoxaparin  GI ppx: famotidine  Abx: C3/azithro  Code status: FULL CODE  Dispo: Continuing inpatient stay for COVID pna c/b CAP. Possible discharge tomorrow.

## 2021-12-14 LAB — PROCALCITONIN SERPL-MCNC: 0.08 NG/ML

## 2021-12-14 PROCEDURE — A9270 NON-COVERED ITEM OR SERVICE: HCPCS

## 2021-12-14 PROCEDURE — 700102 HCHG RX REV CODE 250 W/ 637 OVERRIDE(OP): Performed by: STUDENT IN AN ORGANIZED HEALTH CARE EDUCATION/TRAINING PROGRAM

## 2021-12-14 PROCEDURE — 770001 HCHG ROOM/CARE - MED/SURG/GYN PRIV*

## 2021-12-14 PROCEDURE — 700105 HCHG RX REV CODE 258: Performed by: STUDENT IN AN ORGANIZED HEALTH CARE EDUCATION/TRAINING PROGRAM

## 2021-12-14 PROCEDURE — A9270 NON-COVERED ITEM OR SERVICE: HCPCS | Performed by: EMERGENCY MEDICINE

## 2021-12-14 PROCEDURE — 99232 SBSQ HOSP IP/OBS MODERATE 35: CPT | Mod: GC | Performed by: HOSPITALIST

## 2021-12-14 PROCEDURE — 700111 HCHG RX REV CODE 636 W/ 250 OVERRIDE (IP): Performed by: STUDENT IN AN ORGANIZED HEALTH CARE EDUCATION/TRAINING PROGRAM

## 2021-12-14 PROCEDURE — 700111 HCHG RX REV CODE 636 W/ 250 OVERRIDE (IP)

## 2021-12-14 PROCEDURE — 700102 HCHG RX REV CODE 250 W/ 637 OVERRIDE(OP): Performed by: EMERGENCY MEDICINE

## 2021-12-14 PROCEDURE — 700102 HCHG RX REV CODE 250 W/ 637 OVERRIDE(OP)

## 2021-12-14 PROCEDURE — 36415 COLL VENOUS BLD VENIPUNCTURE: CPT

## 2021-12-14 PROCEDURE — 84145 PROCALCITONIN (PCT): CPT

## 2021-12-14 PROCEDURE — A9270 NON-COVERED ITEM OR SERVICE: HCPCS | Performed by: STUDENT IN AN ORGANIZED HEALTH CARE EDUCATION/TRAINING PROGRAM

## 2021-12-14 RX ADMIN — FAMOTIDINE 10 MG: 20 TABLET ORAL at 17:13

## 2021-12-14 RX ADMIN — CEFTRIAXONE SODIUM 2 G: 2 INJECTION, POWDER, FOR SOLUTION INTRAMUSCULAR; INTRAVENOUS at 04:07

## 2021-12-14 RX ADMIN — Medication 5 MG: at 21:20

## 2021-12-14 RX ADMIN — GUAIFENESIN 600 MG: 600 TABLET, EXTENDED RELEASE ORAL at 11:32

## 2021-12-14 RX ADMIN — AZITHROMYCIN MONOHYDRATE 500 MG: 250 TABLET ORAL at 04:07

## 2021-12-14 RX ADMIN — ALBUTEROL SULFATE 2 PUFF: 90 AEROSOL, METERED RESPIRATORY (INHALATION) at 14:10

## 2021-12-14 RX ADMIN — SENNOSIDES AND DOCUSATE SODIUM 2 TABLET: 50; 8.6 TABLET ORAL at 04:08

## 2021-12-14 RX ADMIN — ALBUTEROL SULFATE 2 PUFF: 90 AEROSOL, METERED RESPIRATORY (INHALATION) at 21:20

## 2021-12-14 RX ADMIN — DEXAMETHASONE 6 MG: 6 TABLET ORAL at 04:07

## 2021-12-14 RX ADMIN — GUAIFENESIN 600 MG: 600 TABLET, EXTENDED RELEASE ORAL at 14:10

## 2021-12-14 RX ADMIN — GUAIFENESIN 600 MG: 600 TABLET, EXTENDED RELEASE ORAL at 21:20

## 2021-12-14 RX ADMIN — GUAIFENESIN 600 MG: 600 TABLET, EXTENDED RELEASE ORAL at 04:07

## 2021-12-14 RX ADMIN — ALBUTEROL SULFATE 2 PUFF: 90 AEROSOL, METERED RESPIRATORY (INHALATION) at 02:00

## 2021-12-14 RX ADMIN — ENOXAPARIN SODIUM 40 MG: 40 INJECTION SUBCUTANEOUS at 04:07

## 2021-12-14 RX ADMIN — FAMOTIDINE 10 MG: 20 TABLET ORAL at 04:07

## 2021-12-14 RX ADMIN — ALBUTEROL SULFATE 2 PUFF: 90 AEROSOL, METERED RESPIRATORY (INHALATION) at 11:33

## 2021-12-14 ASSESSMENT — ENCOUNTER SYMPTOMS
CHILLS: 0
TINGLING: 0
MYALGIAS: 0
HEADACHES: 0
WHEEZING: 0
COUGH: 1
VOMITING: 0
WEAKNESS: 0
SORE THROAT: 0
NAUSEA: 0
FEVER: 0
SHORTNESS OF BREATH: 1
DOUBLE VISION: 0
DIARRHEA: 0
SPUTUM PRODUCTION: 0
BLOOD IN STOOL: 0
CONSTIPATION: 0
BLURRED VISION: 0
DIZZINESS: 0
PALPITATIONS: 0
HEARTBURN: 0
HEMOPTYSIS: 0
ABDOMINAL PAIN: 0

## 2021-12-14 NOTE — PROGRESS NOTES
Daily Progress Note:     Date of Service: 12/14/2021  Primary Team: [facility]   Attending: Raymond Belcher M.D.   Senior Resident: Dr. Bhaskar Moore MD  Intern: Dr. Dewayne Santos MD  Contact:  [phone]    Chief Complaint:   Shortness of breath, fever, and cough    Subjective: Patient is a 25yo male with no significant PMH presenting with shortness of breath, cough, and fever. Tested for COVID on 11/28/21 due to close contact, symptom onset on 12/03/21, and positive test result on 12/05/21. Unvaccinated. Admitted for AHRF 2/2 COVID pneumonia.    No acute events overnight. On 3L NC. Patient states that they feel better and want to go home. Cough is no longer productive. Continues to endorsed shortness of breath with activity.    O2 evaluation performed, requiring 3L at rest and 8L while ambulating.    Consultants/Specialty:  N/A    Review of Systems:    Review of Systems   Constitutional: Negative for chills and fever.   HENT: Negative for ear pain and sore throat.    Eyes: Negative for blurred vision and double vision.   Respiratory: Positive for cough and shortness of breath (with activity). Negative for hemoptysis, sputum production and wheezing.    Cardiovascular: Negative for chest pain and palpitations.   Gastrointestinal: Negative for abdominal pain, blood in stool, constipation, diarrhea, heartburn, melena, nausea and vomiting.   Genitourinary: Negative for dysuria and hematuria.   Musculoskeletal: Negative for myalgias.   Skin: Negative for itching and rash.   Neurological: Negative for dizziness, tingling, weakness and headaches.       Objective Data:   Physical Exam:   Vitals:   Temp:  [36.1 °C (96.9 °F)-37 °C (98.6 °F)] 37 °C (98.6 °F)  Pulse:  [49-84] 49  Resp:  [16-18] 18  BP: (100-119)/(52-63) 100/52  SpO2:  [91 %-98 %] 98 %     Physical Exam  Constitutional:       General: He is not in acute distress.     Appearance: He is not ill-appearing or toxic-appearing.   HENT:      Head:  Normocephalic and atraumatic.      Mouth/Throat:      Mouth: Mucous membranes are moist.      Pharynx: Oropharynx is clear. No oropharyngeal exudate or posterior oropharyngeal erythema.   Eyes:      General: No scleral icterus.     Extraocular Movements: Extraocular movements intact.      Conjunctiva/sclera: Conjunctivae normal.      Pupils: Pupils are equal, round, and reactive to light.   Cardiovascular:      Rate and Rhythm: Normal rate and regular rhythm.      Heart sounds: Normal heart sounds. No murmur heard.  No friction rub. No gallop.    Pulmonary:      Effort: Pulmonary effort is normal. No respiratory distress.      Breath sounds: Rales (trace bibasilar) present. No wheezing or rhonchi.   Abdominal:      General: Abdomen is flat. There is no distension.      Palpations: Abdomen is soft. There is no mass.      Tenderness: There is no abdominal tenderness.   Musculoskeletal:         General: No swelling or tenderness. Normal range of motion.      Cervical back: Normal range of motion and neck supple. No rigidity or tenderness.   Lymphadenopathy:      Cervical: No cervical adenopathy.   Skin:     General: Skin is warm and dry.      Coloration: Skin is not jaundiced.   Neurological:      Mental Status: He is alert and oriented to person, place, and time.      Cranial Nerves: No cranial nerve deficit.           Labs:   Recent Labs     12/12/21  0159   WBC 10.5   RBC 5.56   HEMOGLOBIN 15.9   HEMATOCRIT 46.1   MCV 82.9   MCH 28.6   RDW 37.3   PLATELETCT 160*   MPV 10.5   NEUTSPOLYS 88.30*   LYMPHOCYTES 5.90*   MONOCYTES 4.80   EOSINOPHILS 0.00   BASOPHILS 0.10     Recent Labs     12/12/21  0159   SODIUM 138   POTASSIUM 4.6   CHLORIDE 100   CO2 27   GLUCOSE 133*   BUN 14     Recent Labs     12/12/21  0159   ALBUMIN 4.0   TBILIRUBIN 0.5   ALKPHOSPHAT 71   TOTPROTEIN 6.9   ALTSGPT 43   ASTSGOT 44   CREATININE 0.74       Imaging:   DX-CHEST-PORTABLE (1 VIEW)   Final Result      Persistent bilateral peripheral  interstitial opacities with a basilar predominance consistent with Covid 19 pneumonia.      DX-CHEST-PORTABLE (1 VIEW)   Final Result      Bibasilar interstitial opacities consistent with Covid 19 pneumonia.        A&P  Patient is a 27yo male with no significant PMH presenting with shortness of breath, cough, and fever. Tested for COVID on 11/28/21 due to close contact, symptom onset on 12/03/21, and positive test result on 12/05/21. Unvaccinated. Admitted for AHRF 2/2 COVID pneumonia.    * Acute respiratory failure with hypoxia (HCC)- (present on admission)  Assessment & Plan  Likely 2/2 to COVID pna c/b bacterial pna. Tested for COVID on 11/28/21 due to close contact, symptom onset on 12/03/21, and positive test result on 12/05/21. Unvaccinated.    -RT protocol  -Continue dexamethasone; refer to COVID pna plan  -Continue on C3/azithro; refer to CAP plan  -Continue on guaifenisin for productive cough  -Continue albuterol per RT  -O2 evaluation 12/14/21, requiring 3L at rest and 8L while ambulating; not stable for discharge at this time    Community acquired bacterial pneumonia  Assessment & Plan  Likely CAP with underlying COVID pna. Patient with productive cough and elevated procal.    -Continue on C3 day 3/5 and azithromycin day 3/3    Pneumonia due to COVID-19 virus- (present on admission)  Assessment & Plan  Unvaccinated. Positive COVID test 12/05/21. CXR with bibasilar interstitial opacities indicating COVID pneumonia. Requiring 6L NC on admission; saturating 83% on RA in ED. ABG not concerning. LA and BNP wnl. Procal, CRP, and LDH elevated. D-dimer 1.22; Wells 3, no concern for PE at this time.    -Continue dexamethasone 6mg daily; day 4/10  -Does not meet requirements for remdesivir at this time; ordered TB quant in case of remdesivir/baricitinib need   -Continue on famotidine 10mg bid  -Trial of lasix 20mg IV 12/13/21 with 2.1L output  -Encouraged incentive spirometry and proning    Constipation  Assessment  & Plan  Last BM 12/08/21 on admission    -Scheduled miralax    Dysuria- (present on admission)  Assessment & Plan  Patient with complaints of dysuria and having difficulty emptying bladder on admission. Denies today. UA without sign of infection.    DVT ppx; enoxaparin  GI ppx: famotidine  Abx: C3/azithro  Code status: FULL CODE  Dispo: Continuing inpatient stay for COVID pna c/b CAP.

## 2021-12-14 NOTE — CARE PLAN
Problem: Nutritional:  Goal: Achieve adequate nutritional intake  Description: Patient will consume >50% of meals  Outcome: Progressing   Limited Documentation but PO ~%

## 2021-12-15 ENCOUNTER — TELEPHONE (OUTPATIENT)
Dept: OTHER | Facility: MEDICAL CENTER | Age: 26
End: 2021-12-15

## 2021-12-15 ENCOUNTER — PATIENT OUTREACH (OUTPATIENT)
Dept: HEALTH INFORMATION MANAGEMENT | Facility: OTHER | Age: 26
End: 2021-12-15

## 2021-12-15 VITALS
RESPIRATION RATE: 18 BRPM | HEIGHT: 68 IN | HEART RATE: 63 BPM | BODY MASS INDEX: 30.04 KG/M2 | TEMPERATURE: 96.9 F | WEIGHT: 198.19 LBS | SYSTOLIC BLOOD PRESSURE: 97 MMHG | DIASTOLIC BLOOD PRESSURE: 57 MMHG | OXYGEN SATURATION: 96 %

## 2021-12-15 VITALS — HEART RATE: 53 BPM | RESPIRATION RATE: 16 BRPM | OXYGEN SATURATION: 97 %

## 2021-12-15 LAB
ANION GAP SERPL CALC-SCNC: 8 MMOL/L (ref 7–16)
BUN SERPL-MCNC: 17 MG/DL (ref 8–22)
CALCIUM SERPL-MCNC: 8.6 MG/DL (ref 8.5–10.5)
CHLORIDE SERPL-SCNC: 103 MMOL/L (ref 96–112)
CO2 SERPL-SCNC: 27 MMOL/L (ref 20–33)
CREAT SERPL-MCNC: 0.66 MG/DL (ref 0.5–1.4)
GLUCOSE SERPL-MCNC: 106 MG/DL (ref 65–99)
MAGNESIUM SERPL-MCNC: 2.3 MG/DL (ref 1.5–2.5)
POTASSIUM SERPL-SCNC: 4.3 MMOL/L (ref 3.6–5.5)
SODIUM SERPL-SCNC: 138 MMOL/L (ref 135–145)

## 2021-12-15 PROCEDURE — 700102 HCHG RX REV CODE 250 W/ 637 OVERRIDE(OP): Performed by: STUDENT IN AN ORGANIZED HEALTH CARE EDUCATION/TRAINING PROGRAM

## 2021-12-15 PROCEDURE — 99453 REM MNTR PHYSIOL PARAM SETUP: CPT

## 2021-12-15 PROCEDURE — A9270 NON-COVERED ITEM OR SERVICE: HCPCS | Performed by: STUDENT IN AN ORGANIZED HEALTH CARE EDUCATION/TRAINING PROGRAM

## 2021-12-15 PROCEDURE — 83735 ASSAY OF MAGNESIUM: CPT

## 2021-12-15 PROCEDURE — 700102 HCHG RX REV CODE 250 W/ 637 OVERRIDE(OP)

## 2021-12-15 PROCEDURE — 36415 COLL VENOUS BLD VENIPUNCTURE: CPT

## 2021-12-15 PROCEDURE — 99238 HOSP IP/OBS DSCHRG MGMT 30/<: CPT | Mod: GC | Performed by: HOSPITALIST

## 2021-12-15 PROCEDURE — 700111 HCHG RX REV CODE 636 W/ 250 OVERRIDE (IP)

## 2021-12-15 PROCEDURE — 80048 BASIC METABOLIC PNL TOTAL CA: CPT

## 2021-12-15 PROCEDURE — A9270 NON-COVERED ITEM OR SERVICE: HCPCS | Performed by: EMERGENCY MEDICINE

## 2021-12-15 PROCEDURE — 700105 HCHG RX REV CODE 258: Performed by: STUDENT IN AN ORGANIZED HEALTH CARE EDUCATION/TRAINING PROGRAM

## 2021-12-15 PROCEDURE — 99454 REM MNTR PHYSIOL PARAM 16-30: CPT

## 2021-12-15 PROCEDURE — A9270 NON-COVERED ITEM OR SERVICE: HCPCS

## 2021-12-15 PROCEDURE — 700102 HCHG RX REV CODE 250 W/ 637 OVERRIDE(OP): Performed by: EMERGENCY MEDICINE

## 2021-12-15 PROCEDURE — 700111 HCHG RX REV CODE 636 W/ 250 OVERRIDE (IP): Performed by: STUDENT IN AN ORGANIZED HEALTH CARE EDUCATION/TRAINING PROGRAM

## 2021-12-15 RX ORDER — DEXAMETHASONE 6 MG/1
6 TABLET ORAL DAILY
Qty: 5 TABLET | Refills: 0 | Status: SHIPPED | OUTPATIENT
Start: 2021-12-16 | End: 2021-12-21

## 2021-12-15 RX ORDER — AMOXICILLIN AND CLAVULANATE POTASSIUM 875; 125 MG/1; MG/1
1 TABLET, FILM COATED ORAL 2 TIMES DAILY
Qty: 4 TABLET | Refills: 0 | Status: SHIPPED | OUTPATIENT
Start: 2021-12-15 | End: 2021-12-17

## 2021-12-15 RX ORDER — ALBUTEROL SULFATE 90 UG/1
2 AEROSOL, METERED RESPIRATORY (INHALATION) EVERY 6 HOURS PRN
Qty: 6.7 G | Refills: 0 | Status: SHIPPED | OUTPATIENT
Start: 2021-12-15 | End: 2021-12-22

## 2021-12-15 RX ORDER — FAMOTIDINE 10 MG
10 TABLET ORAL 2 TIMES DAILY
Qty: 10 TABLET | Refills: 0 | Status: SHIPPED | OUTPATIENT
Start: 2021-12-15 | End: 2021-12-20

## 2021-12-15 RX ADMIN — ALBUTEROL SULFATE 2 PUFF: 90 AEROSOL, METERED RESPIRATORY (INHALATION) at 02:00

## 2021-12-15 RX ADMIN — GUAIFENESIN 600 MG: 600 TABLET, EXTENDED RELEASE ORAL at 06:00

## 2021-12-15 RX ADMIN — CEFTRIAXONE SODIUM 2 G: 2 INJECTION, POWDER, FOR SOLUTION INTRAMUSCULAR; INTRAVENOUS at 06:00

## 2021-12-15 RX ADMIN — ALBUTEROL SULFATE 2 PUFF: 90 AEROSOL, METERED RESPIRATORY (INHALATION) at 14:13

## 2021-12-15 RX ADMIN — DEXAMETHASONE 6 MG: 6 TABLET ORAL at 06:00

## 2021-12-15 RX ADMIN — FAMOTIDINE 10 MG: 20 TABLET ORAL at 06:00

## 2021-12-15 RX ADMIN — ALBUTEROL SULFATE 2 PUFF: 90 AEROSOL, METERED RESPIRATORY (INHALATION) at 08:00

## 2021-12-15 RX ADMIN — GUAIFENESIN 600 MG: 600 TABLET, EXTENDED RELEASE ORAL at 14:13

## 2021-12-15 RX ADMIN — ENOXAPARIN SODIUM 40 MG: 40 INJECTION SUBCUTANEOUS at 06:00

## 2021-12-15 NOTE — DISCHARGE INSTRUCTIONS
Discharge Instructions    Discharged to home by car with relative. Discharged via wheelchair, hospital escort: Yes.  Special equipment needed: Oxygen    Be sure to schedule a follow-up appointment with your primary care doctor or any specialists as instructed.     Discharge Plan:   Diet Plan: Discussed  Activity Level: Discussed  Confirmed Follow up Appointment: Patient to Call and Schedule Appointment  Confirmed Symptoms Management: Discussed  Medication Reconciliation Updated: Yes  Influenza Vaccine Indication: Patient Refuses    I understand that a diet low in cholesterol, fat, and sodium is recommended for good health. Unless I have been given specific instructions below for another diet, I accept this instruction as my diet prescription.   Other diet: Regular    Special Instructions: None    · Is patient discharged on Warfarin / Coumadin?   No     Depression / Suicide Risk    As you are discharged from this [facility] facility, it is important to learn how to keep safe from harming yourself.    Recognize the warning signs:  · Abrupt changes in personality, positive or negative- including increase in energy   · Giving away possessions  · Change in eating patterns- significant weight changes-  positive or negative  · Change in sleeping patterns- unable to sleep or sleeping all the time   · Unwillingness or inability to communicate  · Depression  · Unusual sadness, discouragement and loneliness  · Talk of wanting to die  · Neglect of personal appearance   · Rebelliousness- reckless behavior  · Withdrawal from people/activities they love  · Confusion- inability to concentrate     If you or a loved one observes any of these behaviors or has concerns about self-harm, here's what you can do:  · Talk about it- your feelings and reasons for harming yourself  · Remove any means that you might use to hurt yourself (examples: pills, rope, extension cords, firearm)  · Get professional help from the community (Mental  Health, Substance Abuse, psychological counseling)  · Do not be alone:Call your Safe Contact- someone whom you trust who will be there for you.  · Call your local CRISIS HOTLINE 037-3457 or 577-715-7074  · Call your local Children's Mobile Crisis Response Team [place] (068) 906-1129 or www.Meetrics  · Call the toll free National Suicide Prevention Hotlines   · National Suicide Prevention Lifeline 189-686-LEMP (8180)  · [facility] Hope Line Network 800-SUICIDE (361-4362)    -PLEASE FOLLOW-UP WITH PCP WITHIN 7-10 DAYS.  -PLEASE CONTINUE TAKING DEXAMETHASONE (STEROID) FOR 5 MORE DAYS, STARTING THURS 12/16/21 UNTIL MON 12/20/21.  -PLEASE TAKE AUGMENTIN (ANTIBIOTIC) FOR 2 DAYS; TODAY AND TOMORROW.        Acute Respiratory Failure, Adult    Acute respiratory failure occurs when there is not enough oxygen passing from your lungs to your body. When this happens, your lungs have trouble removing carbon dioxide from the blood. This causes your blood oxygen level to drop too low as carbon dioxide builds up.  Acute respiratory failure is a medical emergency. It can develop quickly, but it is temporary if treated promptly. Your lung capacity, or how much air your lungs can hold, may improve with time, exercise, and treatment.  What are the causes?  There are many possible causes of acute respiratory failure, including:  · Lung injury.  · Chest injury or damage to the ribs or tissues near the lungs.  · Lung conditions that affect the flow of air and blood into and out of the lungs, such as pneumonia, acute respiratory distress syndrome, and cystic fibrosis.  · Medical conditions, such as strokes or spinal cord injuries, that affect the muscles and nerves that control breathing.  · Blood infection (sepsis).  · Inflammation of the pancreas (pancreatitis).  · A blood clot in the lungs (pulmonary embolism).  · A large-volume blood transfusion.  · Burns.  · Near-drowning.  · Seizure.  · Smoke inhalation.  · Reaction to  medicines.  · Alcohol or drug overdose.  What increases the risk?  This condition is more likely to develop in people who have:  · A blocked airway.  · Asthma.  · A condition or disease that damages or weakens the muscles, nerves, bones, or tissues that are involved in breathing.  · A serious infection.  · A health problem that blocks the unconscious reflex that is involved in breathing, such as hypothyroidism or sleep apnea.  · A lung injury or trauma.  What are the signs or symptoms?  Trouble breathing is the main symptom of acute respiratory failure. Symptoms may also include:  · Rapid breathing.  · Restlessness or anxiety.  · Skin, lips, or fingernails that appear blue (cyanosis).  · Rapid heart rate.  · Abnormal heart rhythms (arrhythmias).  · Confusion or changes in behavior.  · Tiredness or loss of energy.  · Feeling sleepy or having a loss of consciousness.  How is this diagnosed?  Your health care provider can diagnose acute respiratory failure with a medical history and physical exam. During the exam, your health care provider will listen to your heart and check for crackling or wheezing sounds in your lungs. Your may also have tests to confirm the diagnosis and determine what is causing respiratory failure. These tests may include:  · Measuring the amount of oxygen in your blood (pulse oximetry). The measurement comes from a small device that is placed on your finger, earlobe, or toe.  · Other blood tests to measure blood gases and to look for signs of infection.  · Sampling your cerebral spinal fluid or tracheal fluid to check for infections.  · Chest X-ray to look for fluid in spaces that should be filled with air.  · Electrocardiogram (ECG) to look at the heart's electrical activity.  How is this treated?  Treatment for this condition usually takes places in a hospital intensive care unit (ICU). Treatment depends on what is causing the condition. It may include one or more treatments until your  symptoms improve. Treatment may include:  · Supplemental oxygen. Extra oxygen is given through a tube in the nose, a face mask, or a pineda.  · A device such as a continuous positive airway pressure (CPAP) or bi-level positive airway pressure (BiPAP or BPAP) machine. This treatment uses mild air pressure to keep the airways open. A mask or other device will be placed over your nose or mouth. A tube that is connected to a motor will deliver oxygen through the mask.  · Ventilator. This treatment helps move air into and out of the lungs. This may be done with a bag and mask or a machine. For this treatment, a tube is placed in your windpipe (trachea) so air and oxygen can flow to the lungs.  · Extracorporeal membrane oxygenation (ECMO). This treatment temporarily takes over the function of the heart and lungs, supplying oxygen and removing carbon dioxide. ECMO gives the lungs a chance to recover. It may be used if a ventilator is not effective.  · Tracheostomy. This is a procedure that creates a hole in the neck to insert a breathing tube.  · Receiving fluids and medicines.  · Rocking the bed to help breathing.  Follow these instructions at home:  · Take over-the-counter and prescription medicines only as told by your health care provider.  · Return to normal activities as told by your health care provider. Ask your health care provider what activities are safe for you.  · Keep all follow-up visits as told by your health care provider. This is important.  How is this prevented?  Treating infections and medical conditions that may lead to acute respiratory failure can help prevent the condition from developing.  Contact a health care provider if:  · You have a fever.  · Your symptoms do not improve or they get worse.  Get help right away if:  · You are having trouble breathing.  · You lose consciousness.  · Your have cyanosis or turn blue.  · You develop a rapid heart rate.  · You are confused.  These symptoms may  represent a serious problem that is an emergency. Do not wait to see if the symptoms will go away. Get medical help right away. Call your local emergency services (911 in the [place] Do not drive yourself to the hospital.  This information is not intended to replace advice given to you by your health care provider. Make sure you discuss any questions you have with your health care provider.  Document Released: 12/23/2014 Document Revised: 11/30/2018 Document Reviewed: 07/05/2017  [employer] Patient Education © 2020 [employer]        Community-Acquired Pneumonia, Adult  Pneumonia is an infection of the lungs. It causes swelling in the airways of the lungs. Mucus and fluid may also build up inside the airways.  One type of pneumonia can happen while a person is in a hospital. A different type can happen when a person is not in a hospital (community-acquired pneumonia).   What are the causes?    This condition is caused by germs (viruses, bacteria, or fungi). Some types of germs can be passed from one person to another. This can happen when you breathe in droplets from the cough or sneeze of an infected person.  What increases the risk?  You are more likely to develop this condition if you:  · Have a long-term (chronic) disease, such as:  ? Chronic obstructive pulmonary disease (COPD).  ? Asthma.  ? Cystic fibrosis.  ? Congestive heart failure.  ? Diabetes.  ? Kidney disease.  · Have HIV.  · Have sickle cell disease.  · Have had your spleen removed.  · Do not take good care of your teeth and mouth (poor dental hygiene).  · Have a medical condition that increases the risk of breathing in droplets from your own mouth and nose.  · Have a weakened body defense system (immune system).  · Are a smoker.  · Travel to areas where the germs that cause this illness are common.  · Are around certain animals or the places they live.  What are the signs or symptoms?  · A dry cough.  · A wet (productive)  cough.  · Fever.  · Sweating.  · Chest pain. This often happens when breathing deeply or coughing.  · Fast breathing or trouble breathing.  · Shortness of breath.  · Shaking chills.  · Feeling tired (fatigue).  · Muscle aches.  How is this treated?  Treatment for this condition depends on many things. Most adults can be treated at home. In some cases, treatment must happen in a hospital. Treatment may include:  · Medicines given by mouth or through an IV tube.  · Being given extra oxygen.  · Respiratory therapy.  In rare cases, treatment for very bad pneumonia may include:  · Using a machine to help you breathe.  · Having a procedure to remove fluid from around your lungs.  Follow these instructions at home:  Medicines  · Take over-the-counter and prescription medicines only as told by your doctor.  ? Only take cough medicine if you are losing sleep.  · If you were prescribed an antibiotic medicine, take it as told by your doctor. Do not stop taking the antibiotic even if you start to feel better.  General instructions    · Sleep with your head and neck raised (elevated). You can do this by sleeping in a recliner or by putting a few pillows under your head.  · Rest as needed. Get at least 8 hours of sleep each night.  · Drink enough water to keep your pee (urine) pale yellow.  · Eat a healthy diet that includes plenty of vegetables, fruits, whole grains, low-fat dairy products, and lean protein.  · Do not use any products that contain nicotine or tobacco. These include cigarettes, e-cigarettes, and chewing tobacco. If you need help quitting, ask your doctor.  · Keep all follow-up visits as told by your doctor. This is important.  How is this prevented?  A shot (vaccine) can help prevent pneumonia. Shots are often suggested for:  · People older than 65 years of age.  · People older than 19 years of age who:  ? Are having cancer treatment.  ? Have long-term (chronic) lung disease.  ? Have problems with their body's  defense system.  You may also prevent pneumonia if you take these actions:  · Get the flu (influenza) shot every year.  · Go to the dentist as often as told.  · Wash your hands often. If you cannot use soap and water, use hand .  Contact a doctor if:  · You have a fever.  · You lose sleep because your cough medicine does not help.  Get help right away if:  · You are short of breath and it gets worse.  · You have more chest pain.  · Your sickness gets worse. This is very serious if:  ? You are an older adult.  ? Your body's defense system is weak.  · You cough up blood.  Summary  · Pneumonia is an infection of the lungs.  · Most adults can be treated at home. Some will need treatment in a hospital.  · Drink enough water to keep your pee pale yellow.  · Get at least 8 hours of sleep each night.  This information is not intended to replace advice given to you by your health care provider. Make sure you discuss any questions you have with your health care provider.  Document Released: 06/05/2009 Document Revised: 04/08/2020 Document Reviewed: 08/15/2019  [employer] Patient Education © 2020 [employer]        COVID-19  COVID-19 is a respiratory infection that is caused by a virus called severe acute respiratory syndrome coronavirus 2 (SARS-CoV-2). The disease is also known as coronavirus disease or novel coronavirus. In some people, the virus may not cause any symptoms. In others, it may cause a serious infection. The infection can get worse quickly and can lead to complications, such as:  · Pneumonia, or infection of the lungs.  · Acute respiratory distress syndrome or ARDS. This is fluid build-up in the lungs.  · Acute respiratory failure. This is a condition in which there is not enough oxygen passing from the lungs to the body.  · Sepsis or septic shock. This is a serious bodily reaction to an infection.  · Blood clotting problems.  · Secondary infections due to bacteria or fungus.  The virus that causes  COVID-19 is contagious. This means that it can spread from person to person through droplets from coughs and sneezes (respiratory secretions).  What are the causes?  This illness is caused by a virus. You may catch the virus by:  · Breathing in droplets from an infected person's cough or sneeze.  · Touching something, like a table or a doorknob, that was exposed to the virus (contaminated) and then touching your mouth, nose, or eyes.  What increases the risk?  Risk for infection  You are more likely to be infected with this virus if you:  · Live in or travel to an area with a COVID-19 outbreak.  · Come in contact with a sick person who recently traveled to an area with a COVID-19 outbreak.  · Provide care for or live with a person who is infected with COVID-19.  Risk for serious illness  You are more likely to become seriously ill from the virus if you:  · Are 65 years of age or older.  · Have a long-term disease that lowers your body's ability to fight infection (immunocompromised).  · Live in a nursing home or long-term care facility.  · Have a long-term (chronic) disease such as:  ? Chronic lung disease, including chronic obstructive pulmonary disease or asthma  ? Heart disease.  ? Diabetes.  ? Chronic kidney disease.  ? Liver disease.  · Are obese.  What are the signs or symptoms?  Symptoms of this condition can range from mild to severe. Symptoms may appear any time from 2 to 14 days after being exposed to the virus. They include:  · A fever.  · A cough.  · Difficulty breathing.  · Chills.  · Muscle pains.  · A sore throat.  · Loss of taste or smell.  Some people may also have stomach problems, such as nausea, vomiting, or diarrhea.  Other people may not have any symptoms of COVID-19.  How is this diagnosed?  This condition may be diagnosed based on:  · Your signs and symptoms, especially if:  ? You live in an area with a COVID-19 outbreak.  ? You recently traveled to or from an area where the virus is  common.  ? You provide care for or live with a person who was diagnosed with COVID-19.  · A physical exam.  · Lab tests, which may include:  ? A nasal swab to take a sample of fluid from your nose.  ? A throat swab to take a sample of fluid from your throat.  ? A sample of mucus from your lungs (sputum).  ? Blood tests.  · Imaging tests, which may include, X-rays, CT scan, or ultrasound.  How is this treated?  At present, there is no medicine to treat COVID-19. Medicines that treat other diseases are being used on a trial basis to see if they are effective against COVID-19.  Your health care provider will talk with you about ways to treat your symptoms. For most people, the infection is mild and can be managed at home with rest, fluids, and over-the-counter medicines.  Treatment for a serious infection usually takes places in a hospital intensive care unit (ICU). It may include one or more of the following treatments. These treatments are given until your symptoms improve.  · Receiving fluids and medicines through an IV.  · Supplemental oxygen. Extra oxygen is given through a tube in the nose, a face mask, or a pineda.  · Positioning you to lie on your stomach (prone position). This makes it easier for oxygen to get into the lungs.  · Continuous positive airway pressure (CPAP) or bi-level positive airway pressure (BPAP) machine. This treatment uses mild air pressure to keep the airways open. A tube that is connected to a motor delivers oxygen to the body.  · Ventilator. This treatment moves air into and out of the lungs by using a tube that is placed in your windpipe.  · Tracheostomy. This is a procedure to create a hole in the neck so that a breathing tube can be inserted.  · Extracorporeal membrane oxygenation (ECMO). This procedure gives the lungs a chance to recover by taking over the functions of the heart and lungs. It supplies oxygen to the body and removes carbon dioxide.  Follow these instructions at  home:  Lifestyle  · If you are sick, stay home except to get medical care. Your health care provider will tell you how long to stay home. Call your health care provider before you go for medical care.  · Rest at home as told by your health care provider.  · Do not use any products that contain nicotine or tobacco, such as cigarettes, e-cigarettes, and chewing tobacco. If you need help quitting, ask your health care provider.  · Return to your normal activities as told by your health care provider. Ask your health care provider what activities are safe for you.  General instructions  · Take over-the-counter and prescription medicines only as told by your health care provider.  · Drink enough fluid to keep your urine pale yellow.  · Keep all follow-up visits as told by your health care provider. This is important.  How is this prevented?    There is no vaccine to help prevent COVID-19 infection. However, there are steps you can take to protect yourself and others from this virus.  To protect yourself:   · Do not travel to areas where COVID-19 is a risk. The areas where COVID-19 is reported change often. To identify high-risk areas and travel restrictions, check the CDC travel website: wwwnc.cdc.gov/travel/notices  · If you live in, or must travel to, an area where COVID-19 is a risk, take precautions to avoid infection.  ? Stay away from people who are sick.  ? Wash your hands often with soap and water for 20 seconds. If soap and water are not available, use an alcohol-based hand .  ? Avoid touching your mouth, face, eyes, or nose.  ? Avoid going out in public, follow guidance from your state and local health authorities.  ? If you must go out in public, wear a cloth face covering or face mask.  ? Disinfect objects and surfaces that are frequently touched every day. This may include:  § Counters and tables.  § Doorknobs and light switches.  § Sinks and faucets.  § Electronics, such as phones, remote controls,  keyboards, computers, and tablets.  To protect others:  If you have symptoms of COVID-19, take steps to prevent the virus from spreading to others.  · If you think you have a COVID-19 infection, contact your health care provider right away. Tell your health care team that you think you may have a COVID-19 infection.  · Stay home. Leave your house only to seek medical care. Do not use public transport.  · Do not travel while you are sick.  · Wash your hands often with soap and water for 20 seconds. If soap and water are not available, use alcohol-based hand .  · Stay away from other members of your household. Let healthy household members care for children and pets, if possible. If you have to care for children or pets, wash your hands often and wear a mask. If possible, stay in your own room, separate from others. Use a different bathroom.  · Make sure that all people in your household wash their hands well and often.  · Cough or sneeze into a tissue or your sleeve or elbow. Do not cough or sneeze into your hand or into the air.  · Wear a cloth face covering or face mask.  Where to find more information  · Centers for Disease Control and Prevention: www.cdc.gov/coronavirus/2019-ncov/index.html  · World Health Organization: www.who.int/health-topics/coronavirus  Contact a health care provider if:  · You live in or have traveled to an area where COVID-19 is a risk and you have symptoms of the infection.  · You have had contact with someone who has COVID-19 and you have symptoms of the infection.  Get help right away if:  · You have trouble breathing.  · You have pain or pressure in your chest.  · You have confusion.  · You have bluish lips and fingernails.  · You have difficulty waking from sleep.  · You have symptoms that get worse.  These symptoms may represent a serious problem that is an emergency. Do not wait to see if the symptoms will go away. Get medical help right away. Call your local emergency  services (911 in the [place] Do not drive yourself to the hospital. Let the emergency medical personnel know if you think you have COVID-19.  Summary  · COVID-19 is a respiratory infection that is caused by a virus. It is also known as coronavirus disease or novel coronavirus. It can cause serious infections, such as pneumonia, acute respiratory distress syndrome, acute respiratory failure, or sepsis.  · The virus that causes COVID-19 is contagious. This means that it can spread from person to person through droplets from coughs and sneezes.  · You are more likely to develop a serious illness if you are 65 years of age or older, have a weak immunity, live in a nursing home, or have chronic disease.  · There is no medicine to treat COVID-19. Your health care provider will talk with you about ways to treat your symptoms.  · Take steps to protect yourself and others from infection. Wash your hands often and disinfect objects and surfaces that are frequently touched every day. Stay away from people who are sick and wear a mask if you are sick.  This information is not intended to replace advice given to you by your health care provider. Make sure you discuss any questions you have with your health care provider.  Document Released: 01/23/2020 Document Revised: 05/14/2020 Document Reviewed: 01/23/2020  [employer] Patient Education © 2020 [employer]

## 2021-12-15 NOTE — DISCHARGE PLANNING
Anticipated Discharge Disposition: Home with O2    Action: LSW received notification from [facility] that patient's [facility] is inactive and patient has active [employer] med[place]. LSW sent updated choice to [facility] as [facility] and [facility] does not take [employer].    Barriers to Discharge: DME acceptance and delivery    Plan: LSW to f/u on referral

## 2021-12-15 NOTE — CARE PLAN
The patient is Stable - Low risk of patient condition declining or worsening    Shift Goals  Clinical Goals: keep O2 sats WNL  Patient Goals: rest    Progress made toward(s) clinical / shift goals:  progressing    Patient is not progressing towards the following goals:      Problem: Knowledge Deficit - Standard  Goal: Patient and family/care givers will demonstrate understanding of plan of care, disease process/condition, diagnostic tests and medications  Outcome: Met     Problem: Psychosocial  Goal: Patient's level of anxiety will decrease  Outcome: Met  Goal: Patient's ability to verbalize feelings about condition will improve  Outcome: Met  Goal: Patient's ability to re-evaluate and adapt role responsibilities will improve  Outcome: Met  Goal: Patient and family will demonstrate ability to cope with life altering diagnosis and/or procedure  Outcome: Met  Goal: Spiritual and cultural needs incorporated into hospitalization  Outcome: Met     Problem: Discharge Barriers/Planning  Goal: Patient's continuum of care needs are met  Outcome: Met     Problem: Hemodynamics  Goal: Patient's hemodynamics, fluid balance and neurologic status will be stable or improve  Outcome: Met     Problem: Respiratory  Goal: Patient will achieve/maintain optimum respiratory ventilation and gas exchange  Outcome: Met     Problem: Fluid Volume  Goal: Fluid volume balance will be maintained  Outcome: Met

## 2021-12-15 NOTE — DISCHARGE PLANNING
Anticipated Discharge Disposition: Home with O2    Action: LSW spoke to patient. Patient agreeable to dc with O2. Patient emailed LSW insurance cards. LSW called [facility] and requested they add to face sheet so DME can be ordered.     Patient requesting work letter with restrictions. LSW informed MD of request.    Barriers to Discharge: DME acceptance and delivery    Plan: LSW to f/u on O2    Care Transition Team Assessment  LSW completed assessment at bedside. Patient previously independent. Patient has insurance through [facility]. Lives with spouse.     Information Source  Orientation Level: Oriented X4  Information Given By: Patient  Who is responsible for making decisions for patient? : Patient    Readmission Evaluation  Is this a readmission?: No    Elopement Risk  Legal Hold: No  Ambulatory or Self Mobile in Wheelchair: Yes  Disoriented: No  Psychiatric Symptoms: None  History of Wandering: No  Elopement this Admit: No  Vocalizing Wanting to Leave: No  Displays Behaviors, Body Language Wanting to Leave: No-Not at Risk for Elopement  Elopement Risk: Not at Risk for Elopement    Interdisciplinary Discharge Planning  Lives with - Patient's Self Care Capacity: Significant Other  Patient or legal guardian wants to designate a caregiver: No  Support Systems: Spouse / Significant Other  Housing / Facility: [address] [facility]  Patient Prefers to be Discharged to:: Home    Discharge Preparedness  What is your plan after discharge?: Home with help  What are your discharge supports?: Spouse  Prior Functional Level: Ambulatory,Drives Self,Independent with Activities of Daily Living,Independent with Medication Management  Difficulity with ADLs: None  Difficulity with IADLs: None    Functional Assesment  Prior Functional Level: Ambulatory,Drives Self,Independent with Activities of Daily Living,Independent with Medication Management    Finances  Financial Barriers to Discharge: No  Prescription Coverage: Yes    Vision / Hearing  Impairment  Vision Impairment : No  Hearing Impairment : No    Advance Directive  Advance Directive?: None    Domestic Abuse  Have you ever been the victim of abuse or violence?: No  Physical Abuse or Sexual Abuse: No  Verbal Abuse or Emotional Abuse: No  Possible Abuse/Neglect Reported to:: Not Applicable    Psychological Assessment  History of Substance Abuse: None  History of Psychiatric Problems: No  Non-compliant with Treatment: No  Newly Diagnosed Illness: No    Discharge Risks or Barriers  Discharge risks or barriers?: No PCP    Anticipated Discharge Information  Discharge Disposition: Discharged to home/self care (01)  Discharge Address: [facility] [address]  Discharge Contact Phone Number: Cheryle wife [phone]

## 2021-12-15 NOTE — RESPIRATORY CARE
REMOTE MONITORING PROGRAM by RESPIRATORY THERAPY  12/15/2021 at 2:55 PM by Zoila Vivas     Patient interviewed by RTA. Patient agrees to Remote Monitoring program. Device instruction performed with welcome packet, consent signed and placed at bedside chart. Patient instructed on how to use MyChart and Zoom. No questions at this time.

## 2021-12-15 NOTE — DISCHARGE SUMMARY
Discharge Summary    CHIEF COMPLAINT ON ADMISSION  Chief Complaint   Patient presents with   • Shortness of Breath     X 2 days, dx of Covid 12/7, sx started 12/5, pt went to [facility] on 12/7, did not require O2, sent home, pt was 83% on RA at triage, VSS on 6L NC, denies medical hx   • Fever     X 6 days, high of 101F at home, afebrile at triage       Reason for Admission  Acute hypoxic respiratory failure secondary to COVID pneumonia    Admission Date  12/11/2021    CODE STATUS  Full Code    HPI & HOSPITAL COURSE  Patient is a 25yo male with no significant PMH that presented with shortness of breath, cough, and fever on 12/11/21. Tested for COVID on 11/28/21 due to close contact, symptom onset on 12/03/21, and positive test result on 12/05/21. Unvaccinated. Admitted on 12/11/21 for acute hypoxic respiratory failure secondary to COVID pneumonia, complicated by community acquired pneumonia.    Acute hypoxic respiratory failure  Pneumonia due to COVID-19  Community acquired bacterial pneumonia  Tested for COVID on 11/28/21 due to close contact, symptom onset on 12/03/21, and positive test result on 12/05/21. Unvaccinated. CXR with bibasilar interstitial opacities indicating COVID pneumonia. Requiring 6L NC on admission; saturating 83% on RA in ED. ABG not concerning. LA and BNP wnl. Procal, CRP, and LDH elevated. D-dimer 1.22; Wells 3, no concern for PE at this time.    Patient was placed on RT protocol with albuterol treatment. Started on 10-day course of dexamethasone. Started on antibiotic treatment of community acquired pneumonia due to elevated procalcitonin and productive cough; ceftriaxone and azithromycin. Patient's oxygenation requirement was initially 6L, increasing to 10-15L on HD#2, with significant daily improvement, to where now requiring only 3L at rest and ambulation.    Patient completed azithromycin course. Will continue dexamethasone course outpatient for 5 more days and augmentin for 2 more days. Being  discharged on 3L oxygen.       Vitals:  Temp:  [35.9 °C (96.6 °F)-36.3 °C (97.3 °F)] 36.1 °C (96.9 °F)  Pulse:  [58-69] 63  Resp:  [18] 18  BP: ()/(45-60) 97/57  SpO2:  [93 %-96 %] 96 %    Physical Exam  Constitutional:       General: He is not in acute distress.     Appearance: He is not ill-appearing or toxic-appearing.   HENT:      Head: Normocephalic and atraumatic.      Mouth/Throat:      Mouth: Mucous membranes are moist.      Pharynx: Oropharynx is clear. No oropharyngeal exudate or posterior oropharyngeal erythema.   Eyes:      General: No scleral icterus.     Extraocular Movements: Extraocular movements intact.      Conjunctiva/sclera: Conjunctivae normal.      Pupils: Pupils are equal, round, and reactive to light.   Cardiovascular:      Rate and Rhythm: Normal rate and regular rhythm.      Heart sounds: Normal heart sounds. No murmur heard.  No friction rub. No gallop.    Pulmonary:      Effort: Pulmonary effort is normal. No respiratory distress.      Breath sounds: No wheezing, rhonchi or rales.   Abdominal:      General: Abdomen is flat. There is no distension.      Palpations: Abdomen is soft. There is no mass.      Tenderness: There is no abdominal tenderness.   Musculoskeletal:         General: No swelling or tenderness. Normal range of motion.      Cervical back: Normal range of motion and neck supple. No rigidity or tenderness.   Lymphadenopathy:      Cervical: No cervical adenopathy.   Skin:     General: Skin is warm and dry.      Coloration: Skin is not jaundiced.   Neurological:      Mental Status: He is alert and oriented to person, place, and time.      Cranial Nerves: No cranial nerve deficit.         No notes on file    Therefore, he is discharged in good and stable condition to home with close outpatient follow-up.    The patient met 2-midnight criteria for an inpatient stay at the time of discharge.    Discharge Date  12/15/2021    FOLLOW UP ITEMS POST DISCHARGE  Follow-up with PCP  within 7-10 days.    DISCHARGE DIAGNOSES  Principal Problem:    Acute respiratory failure with hypoxia (HCC) POA: Yes  Active Problems:    Pneumonia due to COVID-19 virus POA: Yes    Community acquired bacterial pneumonia POA: Unknown    Dysuria POA: Yes    Constipation POA: Unknown  Resolved Problems:    Nausea POA: Unknown    Fever POA: Unknown    Cough POA: Unknown      FOLLOW UP  No future appointments.  No follow-up provider specified.    MEDICATIONS ON DISCHARGE     Medication List        START taking these medications        Instructions   amoxicillin-clavulanate 875-125 MG Tabs  Commonly known as: AUGMENTIN   Take 1 Tablet by mouth 2 times a day for 2 days.  Dose: 1 Tablet     dexamethasone 6 MG Tabs  Start taking on: December 16, 2021  Commonly known as: DECADRON   Take 1 Tablet by mouth every day for 5 doses.  Dose: 6 mg     famotidine 10 MG tablet  Commonly known as: PEPCID   Take 1 Tablet by mouth 2 times a day for 5 days.  Dose: 10 mg            CHANGE how you take these medications        Instructions   albuterol 108 (90 Base) MCG/ACT Aers inhalation aerosol  What changed: reasons to take this   Inhale 2 Puffs every 6 hours as needed for Shortness of Breath for up to 7 days.  Dose: 2 Puff            CONTINUE taking these medications        Instructions   fish oil 1000 MG Caps capsule   Take 1,000 mg by mouth every day.  Dose: 1,000 mg     multivitamin Tabs   Take 1 Tablet by mouth every day.  Dose: 1 Tablet     VITAMIN C PO   Take 1 Tablet by mouth every day.  Dose: 1 Tablet              Allergies  No Known Allergies    DIET  Orders Placed This Encounter   Procedures   • Diet Order Diet: Regular     Standing Status:   Standing     Number of Occurrences:   1     Order Specific Question:   Diet:     Answer:   Regular [1]       ACTIVITY  As tolerated.  Weight bearing as tolerated    CONSULTATIONS  N/A    PROCEDURES  N/A    LABORATORY  Lab Results   Component Value Date    SODIUM 138 12/15/2021     POTASSIUM 4.3 12/15/2021    CHLORIDE 103 12/15/2021    CO2 27 12/15/2021    GLUCOSE 106 (H) 12/15/2021    BUN 17 12/15/2021    CREATININE 0.66 12/15/2021        Lab Results   Component Value Date    WBC 10.5 12/12/2021    HEMOGLOBIN 15.9 12/12/2021    HEMATOCRIT 46.1 12/12/2021    PLATELETCT 160 (L) 12/12/2021        Total time of the discharge process exceeds 45 minutes.

## 2021-12-15 NOTE — DISCHARGE PLANNING
@7097  Agency/Facility Name: [facility]  Spoke To: Intake  Outcome: Accepted and will be delivered today.    Received Choice form at 1330  Agency/Facility Name: [facility]  Referral sent per Choice form @ 1330     @1330  Agency/Facility Name: [facility]  Spoke To: Arturo  Outcome: Declined due to non-contracted insurance.    Received Choice form at 1250  Agency/Facility Name: [facility]  Referral sent per Choice form @ 1250   Phone [phone].  Fax [phone].

## 2021-12-15 NOTE — FACE TO FACE
"Face to Face Note  -  Durable Medical Equipment    Dewayne Santos M.D. - NPI: [id number]  I certify that this patient is under my care and that they had a durable medical equipment(DME)face to face encounter by myself that meets the physician DME face-to-face encounter requirements with this patient on:    Date of encounter:   Patient:                    MRN:                       YOB: 2020  Rakan Beltrán  [id number]  1995     The encounter with the patient was in whole, or in part, for the following medical condition, which is the primary reason for durable medical equipment:  Covid-19 Infection    I certify that, based on my findings, the following durable medical equipment is medically necessary:  Oxygen.    HOME O2 Saturation Measurements:(Values must be present for Home Oxygen orders)  Room air sat at rest: 92  Room air sat with amb: 87  With liters of O2: 3, O2 sat at rest with O2: 95  With Liters of O2: 3, O2 sat with amb with O2 : 94  Is the patient mobile?: Yes    My Clinical findings support the need for the above equipment due to:  Hypoxia    Supporting Symptoms: The patient requires supplemental oxygen, as the following interventions have been tried with limited or no improvement: \"Bronchodilators and/or steroid inhalers, \"Oral and/or IV steroids, \"Ambulation with oximetry and \"Incentive spirometry    If patient feels more short of breath, they can go up to 6 liters per minute and contact healthcare provider.  "

## 2021-12-16 ENCOUNTER — TELEPHONE (OUTPATIENT)
Dept: OTHER | Facility: MEDICAL CENTER | Age: 26
End: 2021-12-16

## 2021-12-16 VITALS — RESPIRATION RATE: 32 BRPM | OXYGEN SATURATION: 89 % | HEART RATE: 104 BPM

## 2021-12-16 VITALS — HEART RATE: 80 BPM | RESPIRATION RATE: 15 BRPM | OXYGEN SATURATION: 89 %

## 2021-12-16 LAB
BACTERIA BLD CULT: NORMAL
BACTERIA BLD CULT: NORMAL
SIGNIFICANT IND 70042: NORMAL
SIGNIFICANT IND 70042: NORMAL
SITE SITE: NORMAL
SITE SITE: NORMAL
SOURCE SOURCE: NORMAL
SOURCE SOURCE: NORMAL

## 2021-12-16 NOTE — TELEPHONE ENCOUNTER
At  0913 low SPo2 alert of 81%. Contacted patient. Patient states he is feeling fine he was going to the restroom. Patient SPo2 increased to 89%.

## 2021-12-16 NOTE — TELEPHONE ENCOUNTER
At  1500 low SPo2 alert of 84%. Contacted patient. Patient states he was walking to the restroom. Patient SPo2 increased to 89%.

## 2021-12-17 ENCOUNTER — TELEPHONE (OUTPATIENT)
Dept: OTHER | Facility: MEDICAL CENTER | Age: 26
End: 2021-12-17

## 2021-12-17 VITALS — HEART RATE: 83 BPM | RESPIRATION RATE: 29 BRPM | OXYGEN SATURATION: 63 %

## 2021-12-17 VITALS — HEART RATE: 130 BPM | RESPIRATION RATE: 17 BRPM | OXYGEN SATURATION: 93 %

## 2021-12-17 NOTE — TELEPHONE ENCOUNTER
Patient called to let us know he was going to disconnect for shower and will call us when he is ready to reconnect.

## 2021-12-17 NOTE — DISCHARGE PLANNING
"Received call from patient stating that he is \"out of oxygen\". Per patient he still has concentrator but wants oxygen for \"going out\". Discussed quarantine guidelines with patient and to call DME company or remote monitoring company.   "

## 2021-12-18 ENCOUNTER — TELEPHONE (OUTPATIENT)
Dept: OTHER | Facility: MEDICAL CENTER | Age: 26
End: 2021-12-18

## 2021-12-18 NOTE — TELEPHONE ENCOUNTER
Called patient and spoke with wife his battery is dead and I will selma back at 8 to help walk them thru

## 2021-12-18 NOTE — TELEPHONE ENCOUNTER
Called Pt and EC no answer from both/ received a call from EC stated that pt is okay and is coming back from [place] will be losing reception

## 2021-12-19 ENCOUNTER — TELEPHONE (OUTPATIENT)
Dept: OTHER | Facility: MEDICAL CENTER | Age: 26
End: 2021-12-19

## 2021-12-19 VITALS — RESPIRATION RATE: 18 BRPM | HEART RATE: 152 BPM | OXYGEN SATURATION: 95 %

## 2021-12-19 VITALS — HEART RATE: 80 BPM | OXYGEN SATURATION: 92 %

## 2021-12-19 NOTE — TELEPHONE ENCOUNTER
Contacted patient but there was no answer at 0335. I also contacted the EC. I left a voicemail on EC to please call me back due to disconnect

## 2021-12-19 NOTE — TELEPHONE ENCOUNTER
Patient disconnected at 1255. I contacted patient and there was no answer. Unable to leave a voicemail because his mailbox is not set up yet. I sent him a message letting him know we were contacting him.

## 2021-12-19 NOTE — TELEPHONE ENCOUNTER
Patient called me back at 1330 pt apologized because of the no answer. He stated he was having really bad connection. He restarted his bluetooth.and he reconnected at 92%.

## 2021-12-19 NOTE — TELEPHONE ENCOUNTER
I contacted pt at 1317 and no answer. I contacted EC and no answer left a message. I sent a message to patient due to voicemail not being set up and unable to leave a vm.

## 2021-12-20 ENCOUNTER — TELEPHONE (OUTPATIENT)
Dept: OTHER | Facility: MEDICAL CENTER | Age: 26
End: 2021-12-20

## 2021-12-20 VITALS — HEART RATE: 76 BPM | RESPIRATION RATE: 19 BRPM | OXYGEN SATURATION: 91 %

## 2021-12-20 VITALS — OXYGEN SATURATION: 92 % | RESPIRATION RATE: 22 BRPM | HEART RATE: 70 BPM

## 2021-12-20 VITALS — OXYGEN SATURATION: 90 % | HEART RATE: 89 BPM | RESPIRATION RATE: 16 BRPM

## 2021-12-20 VITALS — HEART RATE: 86 BPM | RESPIRATION RATE: 16 BRPM | OXYGEN SATURATION: 89 %

## 2021-12-20 VITALS — OXYGEN SATURATION: 100 % | HEART RATE: 103 BPM

## 2021-12-20 NOTE — TELEPHONE ENCOUNTER
0340  Called [employer] multiple times and no answer, called EC's number and patient answered the phone. Patient will  Reconnect.

## 2021-12-20 NOTE — TELEPHONE ENCOUNTER
0229   Called patient due to him being disconnected. No answer, no voicemail service available. Will try to call patient back

## 2021-12-20 NOTE — TELEPHONE ENCOUNTER
At  0945 low SPo2 alert of 81%. Contacted patient. Patient states he was doing well and was walking to the restroom. Patient SPo2 increased to 89%.

## 2021-12-20 NOTE — TELEPHONE ENCOUNTER
Contacted patient due to disconnect. Patient stated light was flashing green and reconnected at 1128 w/ SpO2 of 96%

## 2021-12-20 NOTE — TELEPHONE ENCOUNTER
Contacted patient due to disconnect at 0743. Patient stated he was doing well and his light was blinking green. Patient stated he would reconnect bluetooth. Patient reconnected at 0747 w/ SpO2 of 92%.

## 2021-12-21 ENCOUNTER — TELEPHONE (OUTPATIENT)
Dept: OTHER | Facility: MEDICAL CENTER | Age: 26
End: 2021-12-21

## 2021-12-21 VITALS — OXYGEN SATURATION: 99 % | HEART RATE: 62 BPM

## 2021-12-21 NOTE — TELEPHONE ENCOUNTER
1431 Patient was disconnected from monitor. Called and patient did not answer and unable to leave voice message will try back in a few minutes.     1436 Called patient again with no answer.    1436 Called patients wife and phone went straight to voicemail. I left a message.      1440 Rakan called back and is reconnecting.

## 2021-12-21 NOTE — TELEPHONE ENCOUNTER
Patient called to let us know he was going to take a shower. He will call us when he is ready to reconnect.

## 2021-12-23 ENCOUNTER — TELEPHONE (OUTPATIENT)
Dept: OTHER | Facility: MEDICAL CENTER | Age: 26
End: 2021-12-23

## 2021-12-23 VITALS — OXYGEN SATURATION: 92 % | HEART RATE: 106 BPM

## 2021-12-23 VITALS — HEART RATE: 71 BPM | OXYGEN SATURATION: 96 %

## 2021-12-23 NOTE — TELEPHONE ENCOUNTER
Contacted patient due to disconnect at 0736. Patient he was doing ok and his light was flashing green. He reconnected bluetooth and reconnected at 0739 w/ SpO2 of 96%.

## 2021-12-24 ENCOUNTER — TELEPHONE (OUTPATIENT)
Dept: OTHER | Facility: MEDICAL CENTER | Age: 26
End: 2021-12-24

## 2021-12-24 VITALS — OXYGEN SATURATION: 87 % | RESPIRATION RATE: 18 BRPM | HEART RATE: 103 BPM

## 2021-12-25 ENCOUNTER — TELEPHONE (OUTPATIENT)
Dept: OTHER | Facility: MEDICAL CENTER | Age: 26
End: 2021-12-25

## 2021-12-25 NOTE — TELEPHONE ENCOUNTER
Called patient, he advised he is doing well. His battery is dead. When he attempted to change the battery the other two batteries he has on hand are also dead. I will talk with the RN about this issue now.

## 2021-12-25 NOTE — TELEPHONE ENCOUNTER
Called patient for Q4 check. He states he is doing well and [facility]  [facility].

## 2021-12-26 ENCOUNTER — TELEPHONE (OUTPATIENT)
Dept: OTHER | Facility: MEDICAL CENTER | Age: 26
End: 2021-12-26

## 2021-12-26 VITALS — OXYGEN SATURATION: 92 % | HEART RATE: 115 BPM

## 2021-12-26 VITALS — OXYGEN SATURATION: 96 % | HEART RATE: 72 BPM | RESPIRATION RATE: 25 BRPM

## 2021-12-26 NOTE — TELEPHONE ENCOUNTER
Contacted patient due to disconnect at 0833. Patient stated he was fine his wrist band was flashing red and he would change the battery a few minutes. Pt stated is is currently on R/A.

## 2021-12-26 NOTE — TELEPHONE ENCOUNTER
Contacted patient due to disconnect at 1521. Pt stated he was doing fine and unsure why disconnected because light is solid green. I had patient check sharing through [employer] and patient stated he didn't see any exclamation points and bluetooth was connected. I had patient turn phone off and on he reconnected again at 1537 w/ SpO2 of 92%.

## 2021-12-27 ENCOUNTER — TELEPHONE (OUTPATIENT)
Dept: OTHER | Facility: MEDICAL CENTER | Age: 26
End: 2021-12-27

## 2021-12-27 VITALS — OXYGEN SATURATION: 60 % | HEART RATE: 72 BPM

## 2021-12-27 VITALS — HEART RATE: 88 BPM | OXYGEN SATURATION: 96 % | RESPIRATION RATE: 13 BRPM

## 2021-12-27 VITALS — RESPIRATION RATE: 29 BRPM | OXYGEN SATURATION: 83 % | HEART RATE: 107 BPM

## 2021-12-27 VITALS — HEART RATE: 93 BPM | OXYGEN SATURATION: 62 %

## 2021-12-27 VITALS — HEART RATE: 89 BPM | RESPIRATION RATE: 17 BRPM | OXYGEN SATURATION: 93 %

## 2021-12-27 NOTE — TELEPHONE ENCOUNTER
At 0650 Disconnected. At 0704 contacted patient and no answer. Tried calling patient back after 5 minutes and still no answer. Contacted patient wife EC contact. She advised me that patient was fine. I told her that patient was disconnected from monitor and if we can just try and get him reconnected and I'm here to help if they needed help troubleshooting. Patient wife stated that patient phone  so she was going to connect it to  and and they will get him reconnected for us. At 0711 patient was reconnected back to his monitor. Will continue to monitor.

## 2021-12-27 NOTE — TELEPHONE ENCOUNTER
1226 Called patient to let him know he alerted for low O2 at 60%. He stated he is doing good and was sleeping. He was laying on his monitor and thinks that's why.  He is not wearing oxygen. He has been on room air. Will continue to monitor and call him back if he alerts again.       1237 Called him back as he disconnected and he stated the wristband went dead. He has no more wristbands to change out. He will be able to go down to the hospital in a couple hours to get some more. He will call us when he is on his way down to the hospital.

## 2021-12-27 NOTE — TELEPHONE ENCOUNTER
0841 Patient still disconnected, no answer when calling and voicemail is not set up.     0850 Attempted to call patient again as he is still disconnected.     0857 Called patients EC with no answer.    0910 Spoke with our RN/Supervisor of the program and I sent a text to both patient and ECstating that if we did not see him reconnect or he did not call us back in 15 minutes we would dispatch [facility].     0925 [facility] was dispatched to patients address.    1005 I followed up with [facility] who stated they had a lot of calls but a supervisor was in route to patients address.    1020 Patient reconnected to monitor. Will continue to monitor and call him if he alerts or disconnects.     Current vitals: HR: 70, RR: 16, O2: 92%

## 2021-12-27 NOTE — TELEPHONE ENCOUNTER
At 0755 Disconnected. Contacted patient and no answer. Waited about 5 minutes and called again. Patient still did not answer. Contacted his EC contact wife and no answer. Left a text message to patient letting him know he is disconnected and if he can reconnect as soon as possible as well as call us back once he sees this message. Will continue to monitor and reach out to make sure patient gets reconnected back to monitor, still disconnected from monitor at 0812.

## 2021-12-27 NOTE — TELEPHONE ENCOUNTER
Called patient, he advises he is doing well. He stated he is at work and on R/A and is just standing in one place. His o2 did come up before ending the call.

## 2021-12-28 ENCOUNTER — TELEPHONE (OUTPATIENT)
Dept: OTHER | Facility: MEDICAL CENTER | Age: 26
End: 2021-12-28

## 2021-12-28 VITALS — OXYGEN SATURATION: 93 % | HEART RATE: 68 BPM | RESPIRATION RATE: 19 BRPM

## 2021-12-28 VITALS — RESPIRATION RATE: 7 BRPM | HEART RATE: 107 BPM | OXYGEN SATURATION: 94 %

## 2021-12-28 VITALS — RESPIRATION RATE: 18 BRPM | HEART RATE: 72 BPM | OXYGEN SATURATION: 96 %

## 2021-12-28 NOTE — TELEPHONE ENCOUNTER
At 1337 patient called to notify us that he was going to disconnect from his monitor to shower. Patient will reconnect back to monitor once finished.

## 2021-12-28 NOTE — TELEPHONE ENCOUNTER
At 0855 Disconnect. Contacted patient. Patient advised that he was fine. I let him know that he was disconnected from his monitor and if could reconnect for us. I told him I will call back in 10-15 minutes if he remain disconnected to help trouble shoot. Patient said no need he fixed it and reconnected on his end. At 0858 patient reconnected back to monitor.

## 2021-12-28 NOTE — TELEPHONE ENCOUNTER
1612 Called patient to let him know he alerted for low O2 at 62%. He did not answer phone. He is back up at 88% will continue to monitor and call him if he alerts again.     1615 Patient alerted again at 79%, called patient again with no answer, patient is back up at 96%

## 2021-12-28 NOTE — TELEPHONE ENCOUNTER
At 1750 Disconnected. Contacted patient at 1758 no answer. Contacted EC and pt answered phone. I stated to patient that he was disconnected and if he could reconnect for us. Patient reconnected back to monitor at 1810.

## 2021-12-29 ENCOUNTER — TELEPHONE (OUTPATIENT)
Dept: OTHER | Facility: MEDICAL CENTER | Age: 26
End: 2021-12-29

## 2021-12-29 VITALS — RESPIRATION RATE: 18 BRPM | OXYGEN SATURATION: 92 % | HEART RATE: 69 BPM

## 2021-12-29 VITALS — RESPIRATION RATE: 32 BRPM | HEART RATE: 100 BPM | OXYGEN SATURATION: 95 %

## 2021-12-29 VITALS — OXYGEN SATURATION: 95 % | RESPIRATION RATE: 25 BRPM | HEART RATE: 103 BPM

## 2021-12-29 NOTE — TELEPHONE ENCOUNTER
Patient alerted at 1528 w/ SpO2 of 78%. Contacted patient and he stated he was doing well and not sure on why he alerted he has been on R/A for 5 days already. His SpO2 went up to 95%. At 1531

## 2021-12-30 ENCOUNTER — TELEPHONE (OUTPATIENT)
Dept: OTHER | Facility: MEDICAL CENTER | Age: 26
End: 2021-12-30

## 2021-12-30 VITALS — OXYGEN SATURATION: 96 % | HEART RATE: 93 BPM | RESPIRATION RATE: 21 BRPM

## 2021-12-30 VITALS — OXYGEN SATURATION: 93 % | HEART RATE: 113 BPM

## 2021-12-30 NOTE — TELEPHONE ENCOUNTER
Contacted patient due to disconnect at 1130 there was no answer. I sent pt a message and he replied letting me know he was ok and reconnected at 1135 w/ SpO2 of 96%.

## 2021-12-30 NOTE — TELEPHONE ENCOUNTER
Contacted pt at 1500 due to disconnect no answer. I called EC at 1506 no answer. Both numbers go straight to voicemail. I left a message to both letting them know to please call us. I will attempt to call again in a few minutes.

## 2021-12-30 NOTE — TELEPHONE ENCOUNTER
Contacted patient at 1809 and at 1814 due to disconnect. Unable to leave voicemail. I sent patient a message letting him know he is disconnected. At 1818 I contacted EC no answer. I updated night shift patient monitor.

## 2021-12-31 ENCOUNTER — TELEPHONE (OUTPATIENT)
Dept: OTHER | Facility: MEDICAL CENTER | Age: 26
End: 2021-12-31

## 2021-12-31 VITALS — OXYGEN SATURATION: 75 % | HEART RATE: 90 BPM

## 2021-12-31 VITALS — HEART RATE: 82 BPM | OXYGEN SATURATION: 98 %

## 2021-12-31 NOTE — TELEPHONE ENCOUNTER
0910 Called patient to let him know he was disconnected. He did nto answer will try again.    0916 Again, no answer    0917 Called wife Cheryle's phone and it went straight to voicemail.     0929 Called patients phone again and he answered and will be connecting back to monitor.

## 2022-01-05 ENCOUNTER — HOSPITAL ENCOUNTER (OUTPATIENT)
Dept: RADIOLOGY | Facility: MEDICAL CENTER | Age: 27
End: 2022-01-05
Attending: INTERNAL MEDICINE
Payer: COMMERCIAL

## 2022-01-05 DIAGNOSIS — J12.82 PNEUMONIA DUE TO COVID-19 VIRUS: ICD-10-CM

## 2022-01-05 DIAGNOSIS — U07.1 PNEUMONIA DUE TO COVID-19 VIRUS: ICD-10-CM

## 2022-01-05 PROCEDURE — 71046 X-RAY EXAM CHEST 2 VIEWS: CPT

## 2022-05-22 ENCOUNTER — HOSPITAL ENCOUNTER (EMERGENCY)
Facility: MEDICAL CENTER | Age: 27
End: 2022-05-22
Attending: EMERGENCY MEDICINE
Payer: COMMERCIAL

## 2022-05-22 VITALS
TEMPERATURE: 98 F | BODY MASS INDEX: 34.75 KG/M2 | SYSTOLIC BLOOD PRESSURE: 126 MMHG | WEIGHT: 229.28 LBS | HEIGHT: 68 IN | OXYGEN SATURATION: 98 % | DIASTOLIC BLOOD PRESSURE: 76 MMHG | HEART RATE: 80 BPM | RESPIRATION RATE: 18 BRPM

## 2022-05-22 DIAGNOSIS — U07.1 COVID: ICD-10-CM

## 2022-05-22 PROCEDURE — 99282 EMERGENCY DEPT VISIT SF MDM: CPT

## 2022-05-22 ASSESSMENT — FIBROSIS 4 INDEX: FIB4 SCORE: 1.09

## 2022-05-22 NOTE — ED TRIAGE NOTES
Pt amb to triage.  Chief Complaint   Patient presents with   • Cough   • Headache   • Runny Nose     Covid + on home test today. Symptoms since yesterday. Pt reports he had covid in December and is not vaccinated.

## 2022-05-22 NOTE — ED NOTES
Discharge instructions and work note given to pt. Pt understood discharge instructions and had no further questions.

## 2022-05-22 NOTE — ED PROVIDER NOTES
"ED Provider Note    Scribed for Isma Sloan M.D. by Jac Rubi. 5/22/2022, 4:28 PM.    Primary care provider: Pcp Pt States None  Means of arrival: Walk-in  History obtained from: Patient  History limited by: None    CHIEF COMPLAINT  Chief Complaint   Patient presents with    Cough    Headache    Runny Nose       HPI  Rakan Beltrán Jr. is a 27 y.o. male who presents to the Emergency Department for COVID-like symptoms onset yesterday. Patient states he tested positive for COVID this morning. Patient states he reports previous history of COVID 5 months ago and was hospitalized for a month. Patient endorses associated diaphoresis, fatigue, cough, headache, and rhinorrhea, but denies any nausea or vomiting. Patient is not COVID vaccinated.    REVIEW OF SYSTEMS  See HPI above     PAST MEDICAL HISTORY       SURGICAL HISTORY  patient denies any surgical history    SOCIAL HISTORY  Social History     Tobacco Use    Smoking status: Never Smoker    Smokeless tobacco: Never Used   Vaping Use    Vaping Use: Unknown      Social History     Substance and Sexual Activity   Drug Use None noted       FAMILY HISTORY  None noted    CURRENT MEDICATIONS  Home Medications       Reviewed by Ros Hill R.N. (Registered Nurse) on 05/22/22 at 1620  Med List Status: Partial     Medication Last Dose Status   Ascorbic Acid (VITAMIN C PO)  Active   beclomethasone (QVAR) 80 MCG/ACT inhaler  Active   multivitamin (THERAGRAN) Tab  Active   Omega-3 Fatty Acids (FISH OIL) 1000 MG Cap capsule  Active                    ALLERGIES  No Known Allergies    PHYSICAL EXAM  VITAL SIGNS: /80   Pulse 89   Temp 36.9 °C (98.4 °F) (Temporal)   Resp 17   Ht 1.727 m (5' 8\")   Wt 104 kg (229 lb 4.5 oz)   SpO2 98%   BMI 34.86 kg/m²     Constitutional: Well developed, Well nourished, No acute distress, Non-toxic appearance.   HENT: Normocephalic, Atraumatic, Bilateral external ears normal.  Eyes: conjunctiva is normal. There are no " signs of exudate.  Cardiovascular: Regular rate and rhythm without murmurs gallops or rubs.   Thorax & Lungs: No respiratory distress. Breathing comfortably. Lungs are clear to auscultation bilaterally, there are no wheezes no rales.   Skin: Warm, Dry, No erythema,   Neurologic: Alert & oriented x 3, Normal motor function, Normal sensory function, No focal deficits noted.   Psychiatric: Affect normal, Judgment normal, Mood normal.     COURSE & MEDICAL DECISION MAKING  Nursing notes, VS, PMSFHx reviewed in chart.    4:28 PM - Patient seen and examined at bedside. The plan for discharge was discussed. I recommend the patient get the COVID vaccine.  Patient and/or family was given the opportunity to ask any questions. Patient and/or family verbalizes understanding and agreement to this plan of care.       Decision Making:  Patient has COVID he is oxygenating well and has no significant findings on examination.  The patient would like to be on Paxlovid so given a prescription of this.  The patient was given the EUA for Paxlovid and at this point I feel the patient is stable for discharge.    The patient will return for new or worsening symptoms and is stable at the time of discharge.    The patient is referred to a primary physician for blood pressure management, diabetic screening, and for all other preventative health concerns.    DISPOSITION:  Patient will be discharged home in stable condition.    FOLLOW UP:  [facility] ([facility]) - Primary Care  [address] [facility] [address]  [phone]        [facility]  [address] [facility] [address]  [phone]        [facility] [address], [address]  [facility] [id number] [phone]        OUTPATIENT MEDICATIONS:  Discharge Medication List as of 5/22/2022  4:43 PM        START taking these medications    Details   Nirmatrelvir & Ritonavir 20 x 150 MG & 10 x 100MG Tablet  Therapy Pack Take 300 mg nirmatrelvir (two 150 mg tablets) with 100 mg  ritonavir (one 100 mg tablet) by mouth, with all three tablets taken together  twice daily for 5 days., Disp-30 Each, R-0, Print Rx Paper               FINAL IMPRESSION  1. COVJac BUTTERFIELD (Scribe), am scribing for, and in the presence of, Isma Sloan M.D..    Electronically signed by: Jac Rubi (Scribe), 5/22/2022    Isma DAO M.D. personally performed the services described in this documentation, as scribed by Jac Rubi in my presence, and it is both accurate and complete.    The note accurately reflects work and decisions made by me.  Isma Sloan M.D.  5/22/2022  7:22 PM

## 2023-05-24 ENCOUNTER — APPOINTMENT (OUTPATIENT)
Dept: MEDICAL GROUP | Facility: MEDICAL CENTER | Age: 28
End: 2023-05-24
Payer: COMMERCIAL

## 2023-05-24 RX ORDER — AMOXICILLIN AND CLAVULANATE POTASSIUM 875; 125 MG/1; MG/1
1 TABLET, FILM COATED ORAL
COMMUNITY
Start: 2023-05-18 | End: 2023-05-25

## 2024-06-19 ENCOUNTER — OFFICE VISIT (OUTPATIENT)
Dept: MEDICAL GROUP | Facility: PHYSICIAN GROUP | Age: 29
End: 2024-06-19
Payer: COMMERCIAL

## 2024-06-19 VITALS
OXYGEN SATURATION: 98 % | HEIGHT: 68 IN | BODY MASS INDEX: 34.1 KG/M2 | WEIGHT: 225 LBS | HEART RATE: 61 BPM | DIASTOLIC BLOOD PRESSURE: 78 MMHG | TEMPERATURE: 98.8 F | SYSTOLIC BLOOD PRESSURE: 118 MMHG

## 2024-06-19 DIAGNOSIS — Z00.00 WELLNESS EXAMINATION: ICD-10-CM

## 2024-06-19 DIAGNOSIS — Z01.84 IMMUNITY STATUS TESTING: ICD-10-CM

## 2024-06-19 DIAGNOSIS — Z00.00 PREVENTATIVE HEALTH CARE: ICD-10-CM

## 2024-06-19 DIAGNOSIS — Z11.59 NEED FOR HEPATITIS C SCREENING TEST: ICD-10-CM

## 2024-06-19 DIAGNOSIS — Z11.4 SCREENING FOR HIV (HUMAN IMMUNODEFICIENCY VIRUS): ICD-10-CM

## 2024-06-19 PROBLEM — J15.9 COMMUNITY ACQUIRED BACTERIAL PNEUMONIA: Status: RESOLVED | Noted: 2021-12-12 | Resolved: 2024-06-19

## 2024-06-19 PROBLEM — J12.82 PNEUMONIA DUE TO COVID-19 VIRUS: Status: RESOLVED | Noted: 2021-12-11 | Resolved: 2024-06-19

## 2024-06-19 PROBLEM — U07.1 PNEUMONIA DUE TO COVID-19 VIRUS: Status: RESOLVED | Noted: 2021-12-11 | Resolved: 2024-06-19

## 2024-06-19 PROBLEM — J96.01 ACUTE RESPIRATORY FAILURE WITH HYPOXIA (HCC): Status: RESOLVED | Noted: 2021-12-11 | Resolved: 2024-06-19

## 2024-06-19 PROCEDURE — 3078F DIAST BP <80 MM HG: CPT | Performed by: STUDENT IN AN ORGANIZED HEALTH CARE EDUCATION/TRAINING PROGRAM

## 2024-06-19 PROCEDURE — 3074F SYST BP LT 130 MM HG: CPT | Performed by: STUDENT IN AN ORGANIZED HEALTH CARE EDUCATION/TRAINING PROGRAM

## 2024-06-19 PROCEDURE — 99385 PREV VISIT NEW AGE 18-39: CPT | Performed by: STUDENT IN AN ORGANIZED HEALTH CARE EDUCATION/TRAINING PROGRAM

## 2024-06-19 ASSESSMENT — PATIENT HEALTH QUESTIONNAIRE - PHQ9: CLINICAL INTERPRETATION OF PHQ2 SCORE: 0

## 2024-06-19 NOTE — PROGRESS NOTES
Subjective:     CC:   Chief Complaint   Patient presents with    [facility] [place] [facility]    Requesting Labs       HPI:   Rakan Beltrán Jr. is a 29 y.o. male who presents for an annual exam. He is feeling well and has no complaints.    Health Maintenance  Cholesterol Screening: n/a   Diabetes Screening: n/a    Infectious disease screening/Immunizations  --STI Screening: declined  --Practices safe sex.  --HIV Screening: ordered   --Hepatitis C Screening: ordered   --Immunizations:    Influenza:     Tetanus: current within the last year per patient    Hepatitis B: unknown  COVID-19: no      He  has a past medical history of Acute respiratory failure with hypoxia (HCC) (12/11/2021), Community acquired bacterial pneumonia (12/12/2021), and Pneumonia due to COVID-19 virus (12/11/2021).    He has no past medical history of Anginal syndrome (HCC), Arrhythmia, Arthritis, Asthma, At risk for sleep apnea, Back pain, Blood clotting disorder (HCC), Bronchitis, Cancer (HCC), Cataract, Congestive heart failure (HCC), COPD (chronic obstructive pulmonary disease) (HCC), Diabetes (HCC), Dialysis patient (HCC), Disorder of thyroid, Fall, Glaucoma, Gynecological disorder, Heart murmur, Heart valve disease, Hemorrhagic disorder (HCC), Hypertension, Indigestion, Jaundice, Myocardial infarct (HCC), Pacemaker, Psychiatric problem, Renal disorder, Rheumatic fever, Seizure (HCC), Stroke (HCC), or Urinary incontinence.  He  has no past surgical history on file.  Family History   Problem Relation Age of Onset    Hypertension Mother     Diabetes Mother     Hypertension Maternal Grandfather     Diabetes Maternal Grandfather     Cancer Neg Hx      Social History     Tobacco Use    Smoking status: Never    Smokeless tobacco: Never   Vaping Use    Vaping status: Some Days    Substances: Nicotine, THC, CBD    Devices: Disposable   Substance Use Topics    Alcohol use: Not Currently     Alcohol/week: 7.2 oz     Types: 12 Cans of beer per week      "Comment: occ    Drug use: Yes     Types: Marijuana, Inhaled       There are no problems to display for this patient.      No current outpatient medications on file.     No current facility-administered medications for this visit.    (including changes today)  Allergies: Patient has no known allergies.    Review of Systems   Constitutional: Negative for fever, chills and malaise/fatigue.   HENT: Negative for congestion.    Eyes: Negative for pain.   Respiratory: Negative for cough and shortness of breath.    Cardiovascular: Negative for leg swelling.   Gastrointestinal: Negative for nausea, vomiting, abdominal pain and diarrhea.   Genitourinary: Negative for dysuria and hematuria.   Skin: Negative for rash.   Neurological: Negative for dizziness, focal weakness and headaches.   Endo/Heme/Allergies: Does not bleed easily.   Psychiatric/Behavioral: Negative for depression.  The patient is not nervous/anxious.      Objective:     /78 (BP Location: Left arm, Patient Position: Sitting, BP Cuff Size: Adult)   Pulse 61   Temp 37.1 °C (98.8 °F) (Temporal)   Ht 1.727 m (5' 8\")   Wt 102 kg (225 lb)   SpO2 98%   BMI 34.21 kg/m²   Body mass index is 34.21 kg/m².  Wt Readings from Last 4 Encounters:   06/19/24 102 kg (225 lb)   05/22/22 104 kg (229 lb 4.5 oz)   12/11/21 89.9 kg (198 lb 3.1 oz)       Physical Exam:  Constitutional: Well-developed and well-nourished. Not diaphoretic. No distress.   Skin: Skin is warm and dry. No rash noted.  Head: Atraumatic without lesions.  Eyes: Conjunctivae and extraocular motions are normal. Pupils are equal, round, and reactive to light. No scleral icterus.   Ears:  External ears unremarkable. Tympanic membranes clear and intact.  Nose: Nares patent. No discharge.   Mouth/Throat: Dentition is fair. Oropharynx is clear and moist. Posterior pharynx without erythema or exudates.  Neck: Supple, trachea midline. Normal range of motion. No thyromegaly present. No " lymphadenopathy--cervical or supraclavicular.  Cardiovascular: Regular rate and rhythm, S1 and S2 without murmur, rubs, or gallops.    Lungs: Effort normal. Clear to auscultation throughout. No adventitious sounds. No CVA tenderness.  Abdomen: Soft, non tender, and without distention.  No rebound, guarding, masses or HSM.  Extremities: No cyanosis, clubbing, erythema, nor edema.  Neurological: Alert and oriented x 3. No cranial nerve deficit. Gait normal.  Psychiatric:  Behavior, mood, and affect are appropriate.          Assessment and Plan:     1. Wellness examination        2. Immunity status testing  VARICELLA ZOSTER IGG AB    HEP B SURFACE AB      3. Preventative health care  Comp Metabolic Panel    Lipid Profile    HEMOGLOBIN A1C      4. Screening for HIV (human immunodeficiency virus)  HIV AG/AB COMBO ASSAY SCREENING      5. Need for hepatitis C screening test  HEP C VIRUS ANTIBODY          - Labs per orders.  - Immunizations per orders.     Anticipatory guidance:  Discussed oral hygiene and dental home.  Discussed healthy nutrition.  Encouraged regular physical activity, including cardio and weights.  Encouraged 8 hours of sleep at night.  Discussed sun protection (clothing and sunscreen).  Discussed STD prevention.       Referral for genetic research was offered. Patient declined.        Follow-up: Return in about 1 year (around 6/19/2025) for annual.

## 2025-01-07 ENCOUNTER — APPOINTMENT (OUTPATIENT)
Dept: MEDICAL GROUP | Facility: PHYSICIAN GROUP | Age: 30
End: 2025-01-07
Payer: COMMERCIAL